# Patient Record
Sex: MALE | Race: WHITE | URBAN - METROPOLITAN AREA
[De-identification: names, ages, dates, MRNs, and addresses within clinical notes are randomized per-mention and may not be internally consistent; named-entity substitution may affect disease eponyms.]

---

## 2017-04-25 ENCOUNTER — TRANSFERRED RECORDS (OUTPATIENT)
Dept: HEALTH INFORMATION MANAGEMENT | Facility: CLINIC | Age: 62
End: 2017-04-25

## 2017-04-28 ENCOUNTER — TRANSFERRED RECORDS (OUTPATIENT)
Dept: HEALTH INFORMATION MANAGEMENT | Facility: CLINIC | Age: 62
End: 2017-04-28

## 2017-05-12 ENCOUNTER — TELEPHONE (OUTPATIENT)
Dept: ORTHOPEDICS | Facility: CLINIC | Age: 62
End: 2017-05-12

## 2017-05-12 ENCOUNTER — PRE VISIT (OUTPATIENT)
Dept: ORTHOPEDICS | Facility: CLINIC | Age: 62
End: 2017-05-12

## 2017-05-12 NOTE — TELEPHONE ENCOUNTER
Litzy from [facility] called to refer patient to us.  Patient has been scheduled with Dr. Kuhn on 5/24/17 at 12:30pm.  I scheduled him this far out as he is scheduled for an MRI the evening of May 22.      Patient has had biopsy.  Slides have not been requested.      Films have been pushed to pacs.  I am faxing notes to scanning.    Claudia Antonio.

## 2017-05-12 NOTE — TELEPHONE ENCOUNTER
1.  Date/reason for appt: 5/24/17 - Rt Arm Mass  2.  Referring provider: Dr. Guillaume Mix  3.  Call to patient (Yes / No - short description): No, records received by clinic  4.  Previous care at / records requested from: [facility] -- [place] received records and sent to urgent scanning, imaging is in Pacs

## 2017-05-24 ENCOUNTER — OFFICE VISIT (OUTPATIENT)
Dept: ORTHOPEDICS | Facility: CLINIC | Age: 62
End: 2017-05-24

## 2017-05-24 VITALS — WEIGHT: 283.6 LBS | HEIGHT: 71 IN | BODY MASS INDEX: 39.7 KG/M2

## 2017-05-24 DIAGNOSIS — M79.89 SOFT TISSUE MASS: Primary | ICD-10-CM

## 2017-05-24 RX ORDER — AMLODIPINE BESYLATE 10 MG/1
10 TABLET ORAL EVERY MORNING
COMMUNITY
Start: 2017-04-18

## 2017-05-24 RX ORDER — FUROSEMIDE 40 MG
40 TABLET ORAL EVERY MORNING
COMMUNITY
Start: 2016-10-18

## 2017-05-24 RX ORDER — METOPROLOL SUCCINATE 50 MG/1
TABLET, EXTENDED RELEASE ORAL
COMMUNITY
Start: 2016-06-12

## 2017-05-24 RX ORDER — AMINO ACIDS/MV,IRON,MIN
1 TABLET ORAL DAILY
COMMUNITY
Start: 2015-04-03

## 2017-05-24 ASSESSMENT — ENCOUNTER SYMPTOMS
NECK PAIN: 0
ARTHRALGIAS: 1
MUSCLE CRAMPS: 0
HYPOTENSION: 0
ORTHOPNEA: 0
BACK PAIN: 1
MYALGIAS: 0
LEG PAIN: 0
HYPERTENSION: 1
LEG SWELLING: 1
STIFFNESS: 1
SWOLLEN GLANDS: 0
MUSCLE WEAKNESS: 0
JOINT SWELLING: 0
SYNCOPE: 0
BRUISES/BLEEDS EASILY: 1
PALPITATIONS: 0

## 2017-05-24 NOTE — PROGRESS NOTES
"ORTHOPEDIC SURGERY CLINIC CONSULT NOTE      REASON FOR VISIT:  Right upper arm mass.      HISTORY OF PRESENT ILLNESS:  Domenico Mayorga is a pleasant 61-year-old male, right-hand dominant, who presents today for evaluation of a large mass in his upper arm.  He says he first noticed this in the middle of April, and it is slowly getting larger.  He mentions no specific pain, but it does ache from time-to-time.  He denies any numbness or tingling down into the distal extremity.  He was evaluated by his primary care doctor, who obtained an MRI, x-rays, and a CT scan of the upper extremity.  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  The patient currently denies any night sweats or pain.  He denies any fevers, chills or recent unexplained weight loss.      REVIEW OF SYSTEMS:  A complete review of systems is otherwise negative, unless as noted above in the HPI.      PAST MEDICAL HISTORY:     1.  Tobacco use:   2.  Hyperglycemia.   3.  Obstructive sleep apnea.   4.  Morbid obesity with a BMI of 39.2.   5.  Hypertension.   6.  History of biliary tube placement.      PAST SURGICAL HISTORY:  Per chart review, there is a history of a biliary tube placement of unknown timing.      ALLERGIES:  Penicillins.      SOCIAL HISTORY:  The patient lives in [place] with his significant other.  He is currently retired, and was a \"\" prior to this.  He smokes 1 pack per day for the last 48 years.  He has occasional alcohol use.      PHYSICAL EXAMINATION:  The patient is alert and oriented x3, in no acute distress.  Breathing is regular and nonlabored.  There is a well circumscribed lesion to his right proximal mid aspect of his arm.  It is well circumscribed and is not adherent to the overlying skin.  It is nontender to palpation and incompressible.  CMS is otherwise intact distally.      IMAGING:  Imaging reports were available via Care Everywhere and were reviewed with Dr." Bam.  MRI does reveal an increased signal uptake on T1 fat suppression with central necrosis.  This is a heterogeneous mass on the medial aspect of the arm.      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      PLAN:  We discussed at length with the patient regarding the diagnosis.  Tissue was sent to me, [facility] would [facility] and review at our [facility] Conference rounds.  I will also refer him for further radiation therapy, likely in [place] where he would prefer to have it obtained.  We also recommend obtaining a CT chest to completely stage him at this time.  We discussed surgical intervention with a wide excision once radiation therapy has been performed with a 4-6 week holiday prior to performing the surgery.  This would likely be a same day surgery with a nerve block.  We discussed at length with the patient, and we will call him with the results of the pathology once reviewed at Tumor Conference.        The patient was seen and discussed with Dr. Kuhn, who agrees with above assessment and plan.   Dictated by Eduar Perez MD, Resident         Answers for HPI/ROS submitted by the patient on 5/24/2017   General Symptoms: No  Skin Symptoms: No  HENT Symptoms: No  EYE SYMPTOMS: No  HEART SYMPTOMS: Yes  LUNG SYMPTOMS: No  INTESTINAL SYMPTOMS: No  URINARY SYMPTOMS: No  REPRODUCTIVE SYMPTOMS: No  SKELETAL SYMPTOMS: Yes  BLOOD SYMPTOMS: Yes  NERVOUS SYSTEM SYMPTOMS: No  MENTAL HEALTH SYMPTOMS: No  Chest pain or pressure: No  Fast or irregular heartbeat: No  Pain in legs with walking: No  Swelling in feet or ankles: Yes  Trouble breathing while lying down: No  Fingers or Toes appear blue: No  High blood pressure: Yes  Low blood pressure: No  Fainting: No  Murmurs: No  Back pain: Yes  Muscle aches: No  Neck pain: No  Swollen joints: No  Joint  pain: Yes  Bone pain: No  Muscle cramps: No  Muscle weakness: No  Joint stiffness: Yes  Bone fracture: No  Anemia: No  Swollen glands: No  Easy bleeding or bruising: Yes    Conflicting answers have been found for some questions. Please document the patient's answers manually.

## 2017-05-24 NOTE — LETTER
5/24/2017       RE: Domenico Mayorga  [address] [facility] [address]     Dear Colleague,    Thank you for referring your patient, Domenico Mayorga, to the [facility]THOP[facility] at [facility]. Please see a copy of my visit note below.    I was present with the resident during the history and exam.  I discussed the case with the resident and agree with the findings as documented in the assessment and plan.    Description of procedure:    The  Patient was placed in a  Supine position. The left shoulder mass was sterilely prepped.  I infiltrated the skin and subcutaneous tissue with lidocaine. Next I made a small incision with an 11 blade. The Seferino-Cut needle was passed 3 times. Myxoid material was obtained. This was sent to pathology. The wound was hemostatic and covered with Steri-Strips and a Band-Aid. The patient was able to ambulate and move his arm without difficulty afterward.    ORTHOPEDIC SURGERY CLINIC CONSULT NOTE      REASON FOR VISIT:  Right upper arm mass.      HISTORY OF PRESENT ILLNESS:  Domenico Mayorga is a pleasant 61-year-old male, right-hand dominant, who presents today for evaluation of a large mass in his upper arm.  He says he first noticed this in the middle of April, and it is slowly getting larger.  He mentions no specific pain, but it does ache from time-to-time.  He denies any numbness or tingling down into the distal extremity.  He was evaluated by his primary care doctor, who obtained an MRI, x-rays, and a CT scan of the upper extremity.  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  The patient currently denies any night sweats or pain.  He denies any fevers, chills or recent unexplained weight loss.      REVIEW OF SYSTEMS:  A complete review of systems is otherwise negative, unless as noted above in the HPI.   PAST MEDICAL HISTORY:     1.  Tobacco use:   2.  Hyperglycemia.   3.  Obstructive sleep apnea.   4.   "Morbid obesity with a BMI of 39.2.   5.  Hypertension.   6.  History of biliary tube placement.      PAST SURGICAL HISTORY:  Per chart review, there is a history of a biliary tube placement of unknown timing.      ALLERGIES:  Penicillins.      SOCIAL HISTORY:  The patient lives in [place] with his significant other.  He is currently retired, and was a \"\" prior to this.  He smokes 1 pack per day for the last 48 years.  He has occasional alcohol use.      PHYSICAL EXAMINATION:  The patient is alert and oriented x3, in no acute distress.  Breathing is regular and nonlabored.  There is a well circumscribed lesion to his right proximal mid aspect of his arm.  It is well circumscribed and is not adherent to the overlying skin.  It is nontender to palpation and incompressible.  CMS is otherwise intact distally.      IMAGING:  Imaging reports were available via Care Everywhere and were reviewed with Dr. Kuhn.  MRI does reveal an increased signal uptake on T1 fat suppression with central necrosis.  This is a heterogeneous mass on the medial aspect of the arm.      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      PLAN:  We discussed at length with the patient regarding the diagnosis.  Tissue was sent to me, we would like to [facility] the [facility] and review at our Tumor Conference rounds.  I will also refer him for further radiation therapy, likely in [place] where he would prefer to have it obtained.  We also recommend obtaining a CT chest to completely stage him at this time.  We discussed surgical intervention with a wide excision once radiation therapy has been performed with a 4-6 week holiday prior to performing the surgery.  This would likely be a same day surgery with a nerve block.  We discussed at length with the patient, and we will call " him with the results of the pathology once reviewed at Tumor Conference.        The patient was seen and discussed with Dr. Kuhn, who agrees with above assessment and plan.   Dictated by Eduar Perez MD, Resident     Answers for HPI/ROS submitted by the patient on 5/24/2017     Conflicting answers have been found for some questions. Please document the patient's answers manually.   Roger Kuhn MD

## 2017-05-24 NOTE — PROGRESS NOTES
I was present with the resident during the history and exam.  I discussed the case with the resident and agree with the findings as documented in the assessment and plan.    Description of procedure:    The  Patient was placed in a  Supine position. The left shoulder mass was sterilely prepped.  I infiltrated the skin and subcutaneous tissue with lidocaine. Next I made a small incision with an 11 blade. The Seferino-Cut needle was passed 3 times. Myxoid material was obtained. This was sent to pathology. The wound was hemostatic and covered with Steri-Strips and a Band-Aid. The patient was able to ambulate and move his arm without difficulty afterward.

## 2017-05-24 NOTE — MR AVS SNAPSHOT
"              After Visit Summary   2017    Domenico Mayorga    MRN: [id number]           Patient Information     Date Of Birth          1955        Visit Information        Provider Department      2017 12:30 PM Roger Kuhn MD [facility]        Today's Diagnoses     Soft tissue mass    -  1       Follow-ups after your visit        Who to contact     Please call your clinic at [phone] to:    Ask questions about your health    Make or cancel appointments    Discuss your medicines    Learn about your test results    Speak to your doctor   If you have compliments or concerns about an experience at your clinic, or if you wish to file a complaint, please contact [facility] Patient Relations at [phone] or email us at [email or web address]         Additional Information About Your Visit        MyChart Information     Evri is an electronic gateway that provides easy, online access to your medical records. With Evri, you can request a clinic appointment, read your test results, renew a prescription or communicate with your care team.     To sign up for [employer] visit the website at [email or web address]   You will be asked to enter the access code listed below, as well as some personal information. Please follow the directions to create your username and password.     Your access code is: [email or web address]  Expires: 2017  6:30 AM     Your access code will  in 90 days. If you need help or a new code, please contact your [facility] Clinic or call [phone] for assistance.        Care EveryWhere ID     This is your Care EveryWhere ID. This could be used by other organizations to access your [facility] medical records  [id number]        Your Vitals Were     Height BMI (Body Mass Index)                1.81 m (5' 11.26\") 39.27 kg/m2           Blood Pressure from Last 3 Encounters:   No data found for " BP    Weight from Last 3 Encounters:   05/24/17 128.6 kg (283 lb 9.6 oz)              We Performed the Following     BIOPSY MUSCLE, PERCUTANEOUS NEEDLE        Primary Care Provider Office Phone # Fax #    Kurtis De Leon [phone]       [address] [address]        Thank you!     Thank you for choosing [facility]OP[facility]  for your care. Our goal is always to provide you with excellent care. Hearing back from our patients is one way we can continue to improve our services. Please take a few minutes to complete the written survey that you may receive in the mail after your visit with us. Thank you!             Your Updated Medication List - Protect others around you: Learn how to safely use, store and throw away your medicines at www.disposemymeds.org.          This list is accurate as of: 5/24/17 11:59 PM.  Always use your most recent med list.                   Brand Name Dispense Instructions for use    amLODIPine 10 MG tablet    NORVASC     Take 10 mg by mouth       aspirin 81 MG EC tablet      Take 81 mg by mouth       furosemide 40 MG tablet    LASIX     Take 40 mg by mouth       metoprolol 50 MG 24 hr tablet    TOPROL-XL     TAKE 1 TABLET BY MOUTH ONCE DAILY. DO NOT CHEW OR CRUSH       OCUVITE EXTRA Tabs

## 2017-05-24 NOTE — NURSING NOTE
"Reason For Visit:   Chief Complaint   Patient presents with     Consult     Pt. states that he is here today for Right Arm Mass. HX: Right Arm Mass Biopsy on 04/28/2017, at [facility]. Referring:  SONIA BERGERON        Pain Assessment  Patient Currently in Pain: Yes  0-10 Pain Scale: 1  Primary Pain Location: Arm  Pain Orientation: Right  Pain Descriptors: Discomfort  Alleviating Factors: Rest               HEIGHT: 5' 11.26\", WEIGHT: 283 lbs 9.6 oz, BMI: Body mass index is 39.27 kg/(m^2).      Current Outpatient Prescriptions   Medication Sig Dispense Refill     amLODIPine (NORVASC) 10 MG tablet Take 10 mg by mouth       aspirin 81 MG EC tablet Take 81 mg by mouth       furosemide (LASIX) 40 MG tablet Take 40 mg by mouth       metoprolol (TOPROL-XL) 50 MG 24 hr tablet TAKE 1 TABLET BY MOUTH ONCE DAILY. DO NOT CHEW OR CRUSH       Multiple Vitamins-Minerals (OCUVITE EXTRA) TABS             Allergies   Allergen Reactions     Penicillins      As child     "

## 2017-05-25 PROCEDURE — 00000346 ZZHCL STATISTIC REVIEW OUTSIDE SLIDES TC 88321: Performed by: ORTHOPAEDIC SURGERY

## 2017-05-26 LAB — COPATH REPORT: NORMAL

## 2017-05-31 ENCOUNTER — TRANSFERRED RECORDS (OUTPATIENT)
Dept: HEALTH INFORMATION MANAGEMENT | Facility: CLINIC | Age: 62
End: 2017-05-31

## 2017-06-06 ENCOUNTER — TELEPHONE (OUTPATIENT)
Dept: ORTHOPEDICS | Facility: CLINIC | Age: 62
End: 2017-06-06

## 2017-06-06 NOTE — TELEPHONE ENCOUNTER
RN calling and spoke with Domenico.   We have obtain imaging and report of the CT Chest done on 05-31-17 at [facility] in [place].  Dr. Kuhn has reviewed the report and the patient has some worrisome nodules.  We would like to discuss with the team at our Thursday conference what the plan will be going forward.  Pt. Will await our call onThursday.

## 2017-06-09 ENCOUNTER — TELEPHONE (OUTPATIENT)
Dept: ORTHOPEDICS | Facility: CLINIC | Age: 62
End: 2017-06-09

## 2017-06-09 NOTE — TELEPHONE ENCOUNTER
RN called and spoke with Domenico.  Patient has some worrisome nodules seen on the CT Chest done on 05-31-17 at [facility] in [place].  We discussed treatment at [facility] tumor conference.  The group decided on a biopsy of the nodules.  Domenico wanted to have it done locally in [place], however after several left voice messages left to [facility].  RN called and secured an appointment with Dr. Gonzalez on June 14 at 1330 here at the [facility].  Patient informed of appointment time and location of Dr. Gonzalez here at the [facility].  He will attend this appointment.  Rosalba Salamanca offered for lodging for overnight, however he denied due to getting a puppy and needs to be back home.  He will call if he changes his mind.

## 2017-06-14 ENCOUNTER — OFFICE VISIT (OUTPATIENT)
Dept: SURGERY | Facility: CLINIC | Age: 62
End: 2017-06-14
Attending: THORACIC SURGERY (CARDIOTHORACIC VASCULAR SURGERY)
Payer: COMMERCIAL

## 2017-06-14 VITALS
TEMPERATURE: 98.6 F | HEIGHT: 72 IN | SYSTOLIC BLOOD PRESSURE: 121 MMHG | OXYGEN SATURATION: 93 % | WEIGHT: 282.9 LBS | BODY MASS INDEX: 38.32 KG/M2 | DIASTOLIC BLOOD PRESSURE: 63 MMHG | RESPIRATION RATE: 12 BRPM | HEART RATE: 65 BPM

## 2017-06-14 DIAGNOSIS — R91.1 NODULE OF LEFT LUNG: Primary | ICD-10-CM

## 2017-06-14 DIAGNOSIS — R91.8 LUNG NODULES: Primary | ICD-10-CM

## 2017-06-14 DIAGNOSIS — C49.9 SARCOMA (H): ICD-10-CM

## 2017-06-14 PROCEDURE — 40000114 ZZH STATISTIC NO CHARGE CLINIC VISIT

## 2017-06-14 RX ORDER — VARENICLINE TARTRATE 1 MG/1
1 TABLET, FILM COATED ORAL
COMMUNITY
Start: 2017-05-31

## 2017-06-14 RX ORDER — CLINDAMYCIN PHOSPHATE 900 MG/50ML
900 INJECTION, SOLUTION INTRAVENOUS
Status: CANCELLED | OUTPATIENT
Start: 2017-06-14

## 2017-06-14 RX ORDER — CLINDAMYCIN PHOSPHATE 900 MG/50ML
900 INJECTION, SOLUTION INTRAVENOUS SEE ADMIN INSTRUCTIONS
Status: CANCELLED | OUTPATIENT
Start: 2017-06-14

## 2017-06-14 ASSESSMENT — PAIN SCALES - GENERAL: PAINLEVEL: NO PAIN (0)

## 2017-06-14 NOTE — LETTER
6/14/2017      RE: Domenico Mayorga  [address] [address]       THORACIC SURGERY - NEW PATIENT OFFICE VISIT      Dear Dr. De Leon,    I saw Mr. Mayorga at Dr. Kuhn s request in consultation for the evaluation and treatment of a bilateral lung nodules in the setting of a RUE sarcoma. .     HPI  Mr. 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 He noticed the bicep mass in April, and a biopsy in early May was positive for sarcoma. On workup he was found to have lung nodules. His oncologist is deciding the combination and order of surgery, chemotherapy, and radiation.     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 The most accessible is in the lingula, close to the fissure with the lower lobe.              PMH  Lower extremity edema for the past 10 years, on lasix.  HTN  COPD    PSH  Tonsillectomy    ETOH rare.   TOB Age 13-present;  2-3 ppd, quit 5 days ago    Physical examination  BMI 38.11  Large mass in RIGHT biceps    From a personal perspective, he lives in [place]. He is retired from working in IT. Prior to that he was a .     IMPRESSION (R91.8) Lung nodules  (primary encounter diagnosis)  (C49.9) Sarcoma (H)      61 year old year-old male with a RUE sarcoma and bilateral lung nodules.     PLAN  I spent a total of 30 minutes with Mr. Mayorga, more than 50% of which were spent in counseling, coordination of care, and face-to-face time. I reviewed the plan as follows:  1) PFT tomorrow  2) PAC tomorrow  3) Procedure planned: Left VATS wedge resection, with possible LEFT thoracotomy.  I reviewed the indications, risks, and benefits of the procedure with Mr. Domenico Mayorga. We discussed the intraoperative risks of bleeding and injury to vital organs, potential postoperative complications including, but not limited to, major respiratory  events, arrhythmia, bleeding, infection, reoperation, and death. I explained the anticipated hospital course (+/- 1-2 days) and postoperative recovery including pain control, chest drain management, and variable degrees of dyspnea (or need for supplemental oxygen) and fatigue that tend to get better with time.  Consent: not obtained today  Regional Anesthesia Plan: intraoperative liposomal bupivacaine  Anticoagulation Plan: enoxaparin 40 mg SQ preop  Smoking Cessation: continue current efforts    All questions were answered and Domenico Mayorga was in agreement with the plan.  I appreciate the opportunity to participate in the care of your patient and will keep you updated.  Sincerely,      Silas Gonzalez MD

## 2017-06-14 NOTE — MR AVS SNAPSHOT
After Visit Summary   6/14/2017    Domenioc Mayorga    MRN: [id number]           Patient Information     Date Of Birth          1955        Visit Information        Provider Department      6/14/2017 1:30 PM Silas Gonzalez MD [facility]        Today's Diagnoses     Lung nodules    -  1    Sarcoma (H)           Follow-ups after your visit        Your next 10 appointments already scheduled     Gentry 15, 2017  7:00 AM CDT   FULL PULMONARY FUNCTION with UC PFL A   [facility] Pulmonary Function Testing ([facility])    [address] [place] [address]  3rd [facility] [address]   [phone]            Gentry 15, 2017  9:30 AM CDT   (Arrive by 9:15 AM)   PAC EVALUATION with  Pac Robi 5   [facility] Preoperative Assessment [place] ([facility])    [address] [place] [id number] [phone]            Gentry 15, 2017 10:30 AM CDT   (Arrive by 10:15 AM)   PAC RN ASSESSMENT with  Pac Rn   [facility] Preoperative Assessment [place] ([facility])    [address] [place] [id number] [phone]            Gentry 15, 2017 11:10 AM CDT   (Arrive by 10:55 AM)   PAC Anesthesia Consult with  Pac Anesthesiologist   [facility] Preoperative Assessment Center ([facility])    [address] [place] [id number] [phone]              Future tests that were ordered for you today     Open Future Orders        Priority Expected Expires Ordered    General PFT Lab (Please always keep checked) Routine  6/14/2018 6/14/2017    Pulmonary Function Test Routine  6/14/2018 6/14/2017            Who to contact     If you have questions or need follow up information about today's clinic visit or your schedule please contact [facility] directly at [phone].  Normal or non-critical lab and imaging results will be  "communicated to you by [employer], letter or phone within 4 business days after the clinic has received the results. If you do not hear from us within 7 days, please contact the clinic through [employer] or phone. If you have a critical or abnormal lab result, we will notify you by phone as soon as possible.  Submit refill requests through [employer] or call your pharmacy and they will forward the refill request to us. Please allow 3 business days for your refill to be completed.          Additional Information About Your Visit        Firethorn Information     Firethorn lets you send messages to your doctor, view your test results, renew your prescriptions, schedule appointments and more. To sign up, go to [email or web address] . Click on \"Log in\" on the left side of the screen, which will take you to the Welcome page. Then click on \"Sign up Now\" on the right side of the page.     You will be asked to enter the access code listed below, as well as some personal information. Please follow the directions to create your username and password.     Your access code is: [email or web address]  Expires: 2017  6:30 AM     Your access code will  in 90 days. If you need help or a new code, please call your [place] clinic or [phone].        Care EveryWhere ID     This is your Care EveryWhere ID. This could be used by other organizations to access your [place] medical records  [id number]        Your Vitals Were     Pulse Temperature Respirations Height Pulse Oximetry BMI (Body Mass Index)    65 98.6  F (37  C) (Oral) 12 1.835 m (6' 0.24\") 93% 38.11 kg/m2       Blood Pressure from Last 3 Encounters:   17 121/63    Weight from Last 3 Encounters:   17 128.3 kg (282 lb 14.4 oz)   17 128.6 kg (283 lb 9.6 oz)              Today, you had the following     No orders found for display       Primary Care Provider Office Phone # Fax #    Kurtis Vivar Haley [phone]       [place] [address] [address] [place] [address]        Thank you!     Thank you for choosing [facility]CER CLINIC  for your care. Our goal is always to provide you with excellent care. Hearing back from our patients is one way we can continue to improve our services. Please take a few minutes to complete the written survey that you may receive in the mail after your visit with us. Thank you!             Your Updated Medication List - Protect others around you: Learn how to safely use, store and throw away your medicines at www.disposemymeds.org.          This list is accurate as of: 6/14/17  3:37 PM.  Always use your most recent med list.                   Brand Name Dispense Instructions for use    amLODIPine 10 MG tablet    NORVASC     Take 10 mg by mouth       aspirin 81 MG EC tablet      Take 81 mg by mouth       furosemide 40 MG tablet    LASIX     Take 40 mg by mouth       metoprolol 50 MG 24 hr tablet    TOPROL-XL     TAKE 1 TABLET BY MOUTH ONCE DAILY. DO NOT CHEW OR CRUSH       OCUVITE EXTRA Tabs          UNABLE TO FIND      Fit and supply ([place]) stocking.       varenicline 1 MG tablet    CHANTIX     Take 1 mg by mouth

## 2017-06-14 NOTE — PROGRESS NOTES
THORACIC SURGERY - NEW PATIENT OFFICE VISIT      Dear Dr. De Leon,    I saw Mr. Mayorga at Dr. Kuhn s request in consultation for the evaluation and treatment of a bilateral lung nodules in the setting of a RUE sarcoma. .     HPI  Mr. 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 He noticed the bicep mass in April, and a biopsy in early May was positive for sarcoma. On workup he was found to have lung nodules. His oncologist is deciding the combination and order of surgery, chemotherapy, and radiation.     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 The most accessible is in the lingula, close to the fissure with the lower lobe.              PMH  Lower extremity edema for the past 10 years, on lasix.  HTN  COPD    PSH  Tonsillectomy    ETOH rare.   TOB Age 13-present;  2-3 ppd, quit 5 days ago    Physical examination  BMI 38.11  Large mass in RIGHT biceps    From a personal perspective, he lives in [place]. He is retired from working in IT. Prior to that he was a .     IMPRESSION (R91.8) Lung nodules  (primary encounter diagnosis)  (C49.9) Sarcoma (H)      61 year old year-old male with a RUE sarcoma and bilateral lung nodules.     PLAN  I spent a total of 30 minutes with Mr. Mayorga, more than 50% of which were spent in counseling, coordination of care, and face-to-face time. I reviewed the plan as follows:  1) PFT tomorrow  2) PAC tomorrow  3) Procedure planned: Left VATS wedge resection, with possible LEFT thoracotomy.  I reviewed the indications, risks, and benefits of the procedure with Mr. Domenico Mayorga. We discussed the intraoperative risks of bleeding and injury to vital organs, potential postoperative complications including, but not limited to, major respiratory events, arrhythmia, bleeding, infection, reoperation, and death. I explained  the anticipated hospital course (+/- 1-2 days) and postoperative recovery including pain control, chest drain management, and variable degrees of dyspnea (or need for supplemental oxygen) and fatigue that tend to get better with time.  Consent: not obtained today  Regional Anesthesia Plan: intraoperative liposomal bupivacaine  Anticoagulation Plan: enoxaparin 40 mg SQ preop  Smoking Cessation: continue current efforts    All questions were answered and Domenico Mayorga was in agreement with the plan.  I appreciate the opportunity to participate in the care of your patient and will keep you updated.  Sincerely,

## 2017-06-15 ENCOUNTER — OFFICE VISIT (OUTPATIENT)
Dept: SURGERY | Facility: CLINIC | Age: 62
End: 2017-06-15

## 2017-06-15 ENCOUNTER — ALLIED HEALTH/NURSE VISIT (OUTPATIENT)
Dept: SURGERY | Facility: CLINIC | Age: 62
End: 2017-06-15

## 2017-06-15 ENCOUNTER — ANESTHESIA EVENT (OUTPATIENT)
Dept: SURGERY | Facility: CLINIC | Age: 62
DRG: 164 | End: 2017-06-15
Payer: COMMERCIAL

## 2017-06-15 ENCOUNTER — ANESTHESIA (OUTPATIENT)
Dept: SURGERY | Facility: CLINIC | Age: 62
DRG: 164 | End: 2017-06-15
Payer: COMMERCIAL

## 2017-06-15 VITALS
HEIGHT: 72 IN | DIASTOLIC BLOOD PRESSURE: 84 MMHG | SYSTOLIC BLOOD PRESSURE: 156 MMHG | TEMPERATURE: 98.1 F | WEIGHT: 281.4 LBS | RESPIRATION RATE: 18 BRPM | HEART RATE: 70 BPM | OXYGEN SATURATION: 94 % | BODY MASS INDEX: 38.11 KG/M2

## 2017-06-15 DIAGNOSIS — R91.1 LUNG NODULE: Primary | ICD-10-CM

## 2017-06-15 DIAGNOSIS — R91.1 LUNG NODULE: ICD-10-CM

## 2017-06-15 DIAGNOSIS — Z01.818 PREOP EXAMINATION: Primary | ICD-10-CM

## 2017-06-15 LAB
ABO + RH BLD: NORMAL
ABO + RH BLD: NORMAL
ANION GAP SERPL CALCULATED.3IONS-SCNC: 9 MMOL/L (ref 3–14)
BLD GP AB SCN SERPL QL: NORMAL
BLOOD BANK CMNT PATIENT-IMP: NORMAL
BLOOD BANK CMNT PATIENT-IMP: NORMAL
BUN SERPL-MCNC: 14 MG/DL (ref 7–30)
CALCIUM SERPL-MCNC: 8.6 MG/DL (ref 8.5–10.1)
CHLORIDE SERPL-SCNC: 106 MMOL/L (ref 94–109)
CO2 SERPL-SCNC: 24 MMOL/L (ref 20–32)
CREAT SERPL-MCNC: 0.85 MG/DL (ref 0.66–1.25)
ERYTHROCYTE [DISTWIDTH] IN BLOOD BY AUTOMATED COUNT: 13.1 % (ref 10–15)
GFR SERPL CREATININE-BSD FRML MDRD: ABNORMAL ML/MIN/1.7M2
GLUCOSE SERPL-MCNC: 118 MG/DL (ref 70–99)
HBA1C MFR BLD: 5.7 % (ref 4.3–6)
HCT VFR BLD AUTO: 51 % (ref 40–53)
HGB BLD-MCNC: 17.7 G/DL (ref 13.3–17.7)
MCH RBC QN AUTO: 31.9 PG (ref 26.5–33)
MCHC RBC AUTO-ENTMCNC: 34.7 G/DL (ref 31.5–36.5)
MCV RBC AUTO: 92 FL (ref 78–100)
PLATELET # BLD AUTO: 265 10E9/L (ref 150–450)
POTASSIUM SERPL-SCNC: 3.9 MMOL/L (ref 3.4–5.3)
RBC # BLD AUTO: 5.54 10E12/L (ref 4.4–5.9)
SODIUM SERPL-SCNC: 140 MMOL/L (ref 133–144)
SPECIMEN EXP DATE BLD: NORMAL
WBC # BLD AUTO: 9.1 10E9/L (ref 4–11)

## 2017-06-15 RX ORDER — IPRATROPIUM BROMIDE AND ALBUTEROL SULFATE 2.5; .5 MG/3ML; MG/3ML
3 SOLUTION RESPIRATORY (INHALATION) ONCE
Status: CANCELLED | OUTPATIENT
Start: 2017-06-15 | End: 2017-06-15

## 2017-06-15 RX ORDER — CHLORHEXIDINE GLUCONATE ORAL RINSE 1.2 MG/ML
15 SOLUTION DENTAL ONCE
Status: CANCELLED | OUTPATIENT
Start: 2017-06-15 | End: 2017-06-15

## 2017-06-15 RX ORDER — CELECOXIB 100 MG/1
200 CAPSULE ORAL ONCE
Status: CANCELLED | OUTPATIENT
Start: 2017-06-15 | End: 2017-06-15

## 2017-06-15 RX ORDER — GABAPENTIN 300 MG/1
300 CAPSULE ORAL ONCE
Status: CANCELLED | OUTPATIENT
Start: 2017-06-15 | End: 2017-06-15

## 2017-06-15 RX ORDER — ACETAMINOPHEN 325 MG/1
975 TABLET ORAL ONCE
Status: CANCELLED | OUTPATIENT
Start: 2017-06-15 | End: 2017-06-15

## 2017-06-15 ASSESSMENT — LIFESTYLE VARIABLES: TOBACCO_USE: 1

## 2017-06-15 NOTE — PATIENT INSTRUCTIONS
Preparing for Your Surgery      Name:  Domenico Mayorga   MRN:  [id number]   :  1955   Today's Date:  6/15/2017     Arriving for surgery:  Surgery date:  17  Surgery time:  730  Arrival time:  0530  Please come to:       [facility] [address]  [address] [place]  [address]    -   parking is available in front of the hospital from 5:15 am to 8:00 pm    -  Stop at the Information Desk in the lobby    -   Inform the information person that you are here for surgery. An escort to 3c will be provided. If you would not like an escort, please proceed to 3C on the 3rd floor. [phone]     What can I eat or drink?  -  You may have solid food or milk products until 8 hours prior to your surgery. 11:30PM  -  You may have water, apple juice or 7up/Sprite until 2 hours prior to your surgery. 5:30AM    Which medicines can I take?  -  Do NOT take these medications in the morning, the day of surgery:  Hold aspirin, lasix, ocuvite and chantix the morning    -  Please take these medications the day of surgery:  Please take amlodipine and metoprolol the morning of your surgery    How do I prepare myself?  -  Take two showers: one the night before surgery; and one the morning of surgery.         Use Scrubcare or Hibiclens to wash from neck down.  You may use your own shampoo and conditioner. No other hair products.   -  Do NOT use lotion, powder, deodorant, or antiperspirant the day of your surgery.  -  Do NOT wear any jewelry.  -Do not bring your own medications to the hospital, except for inhalers and eye drops.  -  Bring your ID and insurance card.    Questions or Concerns:  If you have questions or concerns, please call the  Preoperative Assessment Center, Monday-Friday 7AM-7PM:  [phone]          AFTER YOUR SURGERY  Breathing exercises   Breathing exercises help you recover faster. Take deep breaths and let the air out slowly. This will:     Help you wake up after  surgery.    Help prevent complications like pneumonia.  Preventing complications will help you go home sooner.   Nausea and vomiting   You may feel sick to your stomach after surgery; if so, let your nurse know.    Pain control:  After surgery, you may have pain. Our goal is to help you manage your pain. Pain medicine will help you feel comfortable enough to do activities that will help you heal.  These activities may include breathing exercises, walking and physical therapy.   To help your health care team treat your pain we will ask: 1) If you have pain  2) where it is located 3) describe your pain in your words  Methods of pain control include medications given by mouth, vein or by nerve block for some surgeries.  Sequential Compression Device (SCD) or Pneumo Boots:  You may need to wear SCD S on your legs or feet. These are wraps connected to a machine that pumps in air and releases it. The repeated pumping helps prevent blood clots from forming.

## 2017-06-15 NOTE — H&P
Pre-Operative H & P     CC:  Preoperative exam to assess for increased cardiopulmonary risk while undergoing surgery and anesthesia.    Date of Encounter: 6/15/2017  Primary Care Physician:  Kurtis De Leon  Domenico Mayorga is a 61 year old male who presents for pre-operative H & P in preparation for Left Thoracoscopic Wedge Resection Possible Thoracotomy with Dr. Gonzalez on 6/16/2017 at [facility] under general anesthesia.  Mr. Mayorga was seen in thoracic surgery consultation on 6/14/2017 for bilateral lung nodules in the setting of right upper extremity sarcoma.  Mr. Mayorga noticed the bicep mass this past April. 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  On work-up, patient was found to have lung nodule.    Mr. Mayorga presents to PAC clinic and denies any chest pain, dyspnea or recent fevers, cough, chills or sore throat.  He denies any change in his bowel or bladder habits. He would like to proceed with above surgical intervention.      PAC referral for risk assessment and optimization of anesthesia with comorbid conditions of: HTN; LOUIS; COPD; morbid obesity; LE edema for past 10 years; OA of knees bilaterally; tobacco addiciton with recent cessation; and recent right UE sarcoma and  pulmonary nodules.       History is obtained from the patient and electronic health record.     Past Medical History  Past Medical History:   Diagnosis Date     Hypertension      Lung nodule      Sarcoma (H)     right upper arm     Sleep apnea     no CPap       Past Surgical History  Past Surgical History:   Procedure Laterality Date     SOFT TISSUE SURGERY      sebaceous cyst removal-upper back     TONSILLECTOMY         Hx of Blood transfusions/reactions: denies     Hx of abnormal bleeding or anti-platelet use: ASA - hold DOS    Menstrual history: No LMP for male patient.    Steroid use in the last year: denies    Personal  or FH with difficulty with Anesthesia:  denies    Prior to Admission Medications  Current Outpatient Prescriptions   Medication Sig Dispense Refill     varenicline (CHANTIX) 1 MG tablet Take 1 mg by mouth       UNABLE TO FIND Fit and supply ([facility]) stocking.       amLODIPine (NORVASC) 10 MG tablet Take 10 mg by mouth every morning        aspirin 81 MG EC tablet Take 81 mg by mouth every morning        furosemide (LASIX) 40 MG tablet Take 40 mg by mouth every morning        metoprolol (TOPROL-XL) 50 MG 24 hr tablet TAKE 1 TABLET BY MOUTH ONCE DAILY. DO NOT CHEW OR CRUSH, daily-AM       Multiple Vitamins-Minerals (OCUVITE EXTRA) TABS Take 1 tablet by mouth daily          Allergies  Allergies   Allergen Reactions     Penicillins Unknown     As child       Social History  Social History     Social History     Marital status: Single     Spouse name: N/A     Number of children: 2     Years of education: N/A     Occupational History     retired      Social History Main Topics     Smoking status: Former Smoker     Packs/day: 1.00     Years: 49.00     Types: Cigarettes     Start date: 1/1/1968     Quit date: 6/5/2017     Smokeless tobacco: Not on file      Comment: using chantix     Alcohol use Yes      Comment: 1/month     Drug use: Yes     Special: Marijuana      Comment: daily     Sexual activity: Not on file     Other Topics Concern     Not on file     Social History Narrative    Retired from IT and prior to that .       Family History  No family history on file.                      .Constitutional:  Denies any recent fever, chills or night sweats  ENT: Denies sore throat, visual changes, hearing changes or difficulty with swallowing.   Pulmonary:  H/O LOUIS >>>Does not use CPAP.  COPD without inhalers>>>denies cough, dyspnea, excess sputum or hemoptysis. 45 + pack years smoking history with recently quitting on Chantix.   Neuro:  Denies seizures, TIA, CVA, diplopia, weakness, paraesthesias or paralysis  Psych:   Girish history of depression or anxiety.   Cardiac:  HTN.  Denies chest pain, palpitations, murmur, dizziness, or lightheadedness,  Denies any change in his LE edema that he takes Lasix.    MS:  OA in knees and hands bilaterally  GI:  Morbidly obese. Denies PUD, nausea, vomiting, change in bowel habits or hematochezia.    Renal/hepatic: Denies any history y of kidney or liver problems.   : Denies dysuria or hematuria  Skin: Denies any new rashes, pruritis or lesions of specific concern.   Endocrine: Denies polydipsia or polyuria.  No recent hair loss.    Vitals   /84  HR 71  RR 18 O2sat 94%  Temp 98.1 F Ht 1.829 m (6')  Wt 127.6 kg (281 lb 6.4 oz)  BMI 38.24      Physical Exam  Constitutional: Awake, alert, cooperative, no apparent distress, and appears stated age.  Eyes: Pupils equal, round and reactive to light, extra ocular muscles intact, sclera clear, conjunctiva normal.  HENT: Normocephalic, oral pharynx with moist mucus membranes, dentition in poor to fair repair.  Upper left tooth is chipped. Right thyroid palpable.   Respiratory: Clear to auscultation bilaterally, no crackles or wheezing.  Cardiovascular: Regular rate and rhythm, normal S1 and S2, and no murmur noted.  Carotids +2, no bruits. 1-2+ LE edema with evidence of venous status . Palpable pulses to radial  DP and PT arteries.   GI: Normal bowel sounds, soft, non-distended, non-tender, no masses palpated, no hepatosplenomegaly. Difficult exam given obese abdomen.  Lymph/Hematologic: No cervical lymphadenopathy and no supraclavicular lymphadenopathy.  Genitourinary:  na  Skin: Warm and dry.  No rashes at anticipated surgical site.   Musculoskeletal: Full ROM of neck. There is no redness, warmth, or swelling of the joints. Gross motor strength is normal.    Neurologic: Awake, alert, oriented to name, place and time. Cranial nerves II-XII are grossly intact. Gait is normal.   Neuropsychiatric: Calm, cooperative. Normal affect.     Labs:  (personally reviewed)  Lab Results   Component Value Date    WBC 9.1 06/15/2017     Lab Results   Component Value Date    RBC 5.54 06/15/2017     Lab Results   Component Value Date    HGB 17.7 06/15/2017     Lab Results   Component Value Date    HCT 51.0 06/15/2017     Lab Results   Component Value Date    MCV 92 06/15/2017     Lab Results   Component Value Date    MCH 31.9 06/15/2017     Lab Results   Component Value Date    MCHC 34.7 06/15/2017     Lab Results   Component Value Date    RDW 13.1 06/15/2017     Lab Results   Component Value Date     06/15/2017       Last Basic Metabolic Panel:  Lab Results   Component Value Date     06/15/2017      Lab Results   Component Value Date    POTASSIUM 3.9 06/15/2017     Lab Results   Component Value Date    CHLORIDE 106 06/15/2017     Lab Results   Component Value Date    PHILIP 8.6 06/15/2017     Lab Results   Component Value Date    CO2 24 06/15/2017     Lab Results   Component Value Date    BUN 14 06/15/2017     Lab Results   Component Value Date    CR 0.85 06/15/2017     Lab Results   Component Value Date     06/15/2017     Hgb A1C 5.7 today  Copath Report 05/25/2017 12:00 AM 88   Patient Name: GERRY SCHUSTER   MR#: [id number]   Specimen #: [id number]   Collected: 5/25/2017   Received: 5/25/2017   Reported: 5/26/2017 13:26   Ordering Phy(s): TREY FROST   Additional Phy(s): [facility], Histology Department     For improved result formatting, select 'View Enhanced Report Format'   under Linked Documents section.     TEST(S):   2 Slides, case #[id number]     FINAL DIAGNOSIS:   CASE FROM [facility], [place], [place]   ([id number], OBTAINED 04/28/17):   Soft tissue, right arm mass, biopsy:   - High grade sarcoma with myxoid features, suggestive of   myxofibrosarcoma      Procedures  Echocardiogram 7/11/2017  Interpretation Summary  1. Normal size left ventricle with normal systolic function and no wall  motion abnormalities. EF 55-60%.  2. There is mild concentric left ventricular hypertrophy.  3. No significant valvular regurgitation or stenosis.  4. No pericardial effusion.    Cardiolite Stress Test 5/15/2015  IMPRESSION:  1. No Cardiolite evidence for myocardial ischemia.   2. No chest pain with exercise.   3. Nondiagnostic EKG due to abnormal baseline.   4. Hypertensive response to exercise.   5. Low-normal left ventricular systolic function.    ASSESSMENT and PLAN  Domenico Mayorga is a 61 year old male scheduled to undergo  Left Thoracoscopic Wedge Resection Possible Thoracotomy with Dr. Gonzalez on 6/16/2017 at [facility] under general anesthesia.     Cardiology - RCRI : No serious cardiac risks.  0.4 % risk of major adverse cardiac event. METS >4.  Denies cardiac history or symptoms.  Cardiolite stress test 2015 showed no ischemia.  Echo , 7/2016>>>EF 55-60% with no WMA and no valve issues. HTN, take CCB and BB DOS.  Hold diuretic ans ASA DOS.    Pulmonary - Recently quit smoking.  On Chantix.  45+ pack year smoking hisotry.  COPD without any inhalers.  Lung sounds clear today. PFT's today: FVC 3.2  FEV1 2.38 FEV1/FVC 74% DLCO 31.62.  Known LOUIS, does not use CPAP.  Hematology - VTE risk:  3%.  Increased risk for VTE: Recommend that mechanical VTE prophylaxis be initiated during the perioperative period.    GI - Risk of PONV score = 1.  If > 2, anti-emetic intervention recommended.   Musculoseletal - recommend careful positioning given body habitus and OA of knees bilaterally.    He has the following specific operative considerations:   - Anesthesia considerations:  Refer to PAC assessment in anesthesia records  - Airway:  Appears feasible.      Arrival time, NPO, shower and medication instructions provided by nursing staff today.  Preparing For Your Surgery handout given.  Patient was discussed with Dr Mccall. I spent 20 minutes face to face with patient, assessing,  examining, and educating.    JASON Aguirre CNP  Preoperative Assessment Center  [facility]  Clinic and Surgery Center  Phone: [phone]  Fax: [phone]

## 2017-06-15 NOTE — ANESTHESIA PREPROCEDURE EVALUATION
Anesthesia Evaluation     . Pt has had prior anesthetic. Type: General    No history of anesthetic complications          ROS/MED HX    ENT/Pulmonary:     (+)sleep apnea, other ENT (Wears corrective lenses.)- tobacco use (Quit 6/5/2017.  >45 pack years.), Past use 1-2 PPD packs/day  doesn't use CPAP , . .    Neurologic:  - neg neurologic ROS     Cardiovascular:     (+) hypertension----. : . . . :. . Previous cardiac testing Echodate:7/2016results:Stress Testdate: results: date: results: date: results:          METS/Exercise Tolerance:  >4 METS   Hematologic:  - neg hematologic  ROS       Musculoskeletal:   (+) arthritis (bilateral knees and hands.), , , -       GI/Hepatic:  - neg GI/hepatic ROS       Renal/Genitourinary:  - ROS Renal section negative       Endo:     (+) Obesity (38.19), .      Psychiatric:  - neg psychiatric ROS       Infectious Disease:  - neg infectious disease ROS       Malignancy:   (+) Malignancy History of Lung and Other  Lung CA Active status post. 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  Dentition in poor to fair repair.     Cardiovascular   Rhythm and rate: regular and normal      Pulmonary    breath sounds clear to auscultation    Other findings: Right upper extremity with evidence of mass.  LE 1+ edema with evidence of venous status.  Right thyroid glnad palpable.    Lab Results      Component                Value               Date                      WBC                      9.1                 06/15/2017            Lab Results      Component                Value               Date                      RBC                      5.54                06/15/2017            Lab Results      Component                 Value               Date                      HGB                      17.7                06/15/2017            Lab Results      Component                Value               Date                      HCT                      51.0                06/15/2017            Lab Results      Component                Value               Date                      MCV                      92                  06/15/2017            Lab Results      Component                Value               Date                      MCH                      31.9                06/15/2017            Lab Results      Component                Value               Date                      MCHC                     34.7                06/15/2017            Lab Results      Component                Value               Date                      RDW                      13.1                06/15/2017            Lab Results      Component                Value               Date                      PLT                      265                 06/15/2017              Last Basic Metabolic Panel:  Lab Results      Component                Value               Date                      NA                       140                 06/15/2017             Lab Results      Component                Value               Date                      POTASSIUM                3.9                 06/15/2017            Lab Results      Component                Value               Date                      CHLORIDE                 106                 06/15/2017            Lab Results      Component                Value               Date                      PHILIP                      8.6                 06/15/2017            Lab Results      Component                Value               Date                      CO2                      24                  06/15/2017            Lab Results      Component                Value               Date                      BUN                       14                  06/15/2017            Lab Results      Component                Value               Date                      CR                       0.85                06/15/2017            Lab Results      Component                Value               Date                      GLC                      118                 06/15/2017              Hgb A1C 5.7 today  Copath Report 05/25/2017 12:00 AM 88  Patient Name: GERRY SCHUSTER   MR#: [id number]   Specimen #: [id number]   Collected: 5/25/2017   Received: 5/25/2017   Reported: 5/26/2017 13:26   Ordering Phy(s): TREY FROST   Additional Phy(s): [facility], Histology Department     For improved result formatting, select 'View Enhanced Report Format'   under Linked Documents section.     TEST(S):   2 Slides, case #[id number]     FINAL DIAGNOSIS:   CASE FROM [place], [place]   ([id number], OBTAINED 04/28/17):   Soft tissue, right arm mass, biopsy:   - High grade sarcoma with myxoid features, suggestive of   myxofibrosarcoma     Procedures  Echocardiogram 7/11/2017  Interpretation Summary  1. Normal size left ventricle with normal systolic function and no wall motion abnormalities. EF 55-60%.  2. There is mild concentric left ventricular hypertrophy.  3. No significant valvular regurgitation or stenosis.  4. No pericardial effusion.    Cardiolite Stress Test 5/15/2015  IMPRESSION:  1. No Cardiolite evidence for myocardial ischemia.   2. No chest pain with exercise.   3. Nondiagnostic EKG due to abnormal baseline.   4. Hypertensive response to exercise.   5. Low-normal left ventricular systolic function.           PAC Discussion and Assessment    ASA Classification: 3  Case is suitable for: [place]  Anesthetic techniques and relevant risks discussed: GA  Invasive monitoring and risk discussed:   Types:   Possibility and Risk of blood transfusion discussed: Yes  NPO instructions  given:   Additional anesthetic preparation and risks discussed:   Needs early admission to pre-op area:   Other:     PAC Resident/NP Anesthesia Assessment:  Domenico Mayorga is a 61 year old male scheduled for Left Thoracoscopic Wedge Resection Possible Thoracotomy with Dr. Gonzalez on 6/16/2017 at [facility] under general anesthesia.  Mr. Mayorga was seen in thoracic surgery consultation on 6/14/2017 for bilateral lung nodules in the setting of right upper extremity sarcoma.  Mr. Mayorga noticed the bicep mass this past April. 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  On work-up, patient was found to have lung nodule.    Mr. aMyorga presents to PAC clinic and denies any chest pain, dyspnea or recent fevers, cough, chills or sore throat.  He denies any change in his bowel or bladder habits. He would like to proceed with above surgical intervention.      PAC referral for risk assessment and optimization of anesthesia with comorbid conditions of: HTN; LOUIS; COPD; morbid obesity; LE edema for past 10 years; OA of knees bilaterally and recent right UE sarcoma and pulmonary nodules.      Cardiology - RCRI : No serious cardiac risks.  0.4 % risk of major adverse cardiac event. METS >4.  Denies cardiac history or symptoms.  Cardiolite stress test 2015 showed no ischemia.  Echo , 7/2016>>>EF 55-60% with no WMA and no valve issues. HTN, take CCB and BB DOS.  Hold diuretic ans ASA DOS.    Pulmonary - Recently quit smoking.  On Chantix.  45+ pack year smoking hisotry.  COPD without any inhalers.  Lung sounds clear today. PFT's today: FVC 3.2  FEV1 2.38 FEV1/FVC 74% DLCO 31.62.  Known LOUIS, does not use CPAP.  Hematology - VTE risk:  3%.  Increased risk for VTE: Recommend that mechanical VTE prophylaxis be initiated during the perioperative period.    GI - Risk of PONV score = 1.  If > 2, anti-emetic intervention recommended.    Musculoseletal - recommend careful positioning given body habitus and OA of knees bilaterally.    He has the following specific operative considerations:   - Anesthesia considerations:  Refer to PAC assessment in anesthesia records  - Airway:  Appears feasible.      Arrival time, NPO, shower and medication instructions provided by nursing staff today.  Preparing For Your Surgery handout given.  Patient was discussed with Dr Mccall. I spent 20 minutes face to face with patient, assessing, examining, and educating.    Reviewed and Signed by PAC Mid-Level Provider/Resident  Mid-Level Provider/Resident: Geneva GOMEZ CNP  Date: 6/15/2017  Time: 18:00    Attending Anesthesiologist Anesthesia Assessment:  61 year old for wedge excision of lung nodule in setting of high grade sarcoma of right upper extremity (would avoid right sided IVs and BP). Chart reviewed, patient seen and evaluated; agree with above assessment. No know cardiac disease, but risk factors including long extensive smoking history - quit 5 days ago. Patient has diagnosis of COPD, states that he wheezes on occasion, but has never been started on inhalers. I have written him for a nebulizer treatment, and sent a CPDE consult for them to see him in the postop period. His lungs are clear today, no wheezing, no productive cough, so should do well, but a little RT after surgery might be useful. No known cardiac disease.    Patient is appropriate for the planned procedure without further workup or medical management change. The final anesthesia plan will be determined by the physician anesthesiologist caring for the patient on the day of surgery.      Reviewed and Signed by PAC Anesthesiologist  Anesthesiologist: gricelda  Date: 6/15/2017  Time:   Pass/Fail: Pass  Disposition:     PAC Pharmacist Assessment:        Pharmacist:   Date:   Time:      Anesthesia Plan      History & Physical Review  History and physical reviewed and following examination; no  interval change.    ASA Status:  3 .    NPO Status:  > 8 hours    Plan for General and ETT with Intravenous and Propofol induction. Maintenance will be Balanced and Inhalation.    PONV prophylaxis:  Ondansetron (or other 5HT-3)  Additional equipment: 2nd IV, Videolaryngoscope, Double Lumen ETT, Fiberoptic bronchoscope and Arterial Line   - Ramp for intubation 2/2 BMI of 38    Shirin Lang MD  CA 2 Anesthesia Resident  Pager [phone]    I have examined the patient and reviewed the chart.  I concur with the assessment of Dr. Lang.    Neville Lundberg MD      Postoperative Care  Postoperative pain management:  IV analgesics.      Consents                          .

## 2017-06-15 NOTE — MR AVS SNAPSHOT
After Visit Summary   6/15/2017    Domenico Mayorga    MRN: [id number]           Patient Information     Date Of Birth          1955        Visit Information        Provider Department      6/15/2017 10:30 AM Rn, [facility]        Care Instructions    Preparing for Your Surgery      Name:  Domenico Mayorga   MRN:  [id number]   :  1955   Today's Date:  6/15/2017     Arriving for surgery:  Surgery date:  17  Surgery time:  730  Arrival time:  0530  Please come to:       [facility] [address]  [address] [place]  [address]    -   parking is available in front of the hospital from 5:15 am to 8:00 pm    -  Stop at the Information Desk in the lobby    -   Inform the information person that you are here for surgery. An escort to 3c will be provided. If you would not like an escort, please proceed to 3C on the 3rd floor. [phone]     What can I eat or drink?  -  You may have solid food or milk products until 8 hours prior to your surgery. 11:30PM  -  You may have water, apple juice or 7up/Sprite until 2 hours prior to your surgery. 5:30AM    Which medicines can I take?  -  Do NOT take these medications in the morning, the day of surgery:  Hold aspirin, lasix, ocuvite and chantix the morning    -  Please take these medications the day of surgery:  Please take amlodipine and metoprolol the morning of your surgery    How do I prepare myself?  -  Take two showers: one the night before surgery; and one the morning of surgery.         Use Scrubcare or Hibiclens to wash from neck down.  You may use your own shampoo and conditioner. No other hair products.   -  Do NOT use lotion, powder, deodorant, or antiperspirant the day of your surgery.  -  Do NOT wear any jewelry.  -Do not bring your own medications to the hospital, except for inhalers and eye drops.  -  Bring your ID and insurance card.    Questions or  Concerns:  If you have questions or concerns, please call the  Preoperative Assessment Center, Monday-Friday 7AM-7PM:  [phone]          AFTER YOUR SURGERY  Breathing exercises   Breathing exercises help you recover faster. Take deep breaths and let the air out slowly. This will:     Help you wake up after surgery.    Help prevent complications like pneumonia.  Preventing complications will help you go home sooner.   Nausea and vomiting   You may feel sick to your stomach after surgery; if so, let your nurse know.    Pain control:  After surgery, you may have pain. Our goal is to help you manage your pain. Pain medicine will help you feel comfortable enough to do activities that will help you heal.  These activities may include breathing exercises, walking and physical therapy.   To help your health care team treat your pain we will ask: 1) If you have pain  2) where it is located 3) describe your pain in your words  Methods of pain control include medications given by mouth, vein or by nerve block for some surgeries.  Sequential Compression Device (SCD) or Pneumo Boots:  You may need to wear SCD S on your legs or feet. These are wraps connected to a machine that pumps in air and releases it. The repeated pumping helps prevent blood clots from forming.           Follow-ups after your visit        Your next 10 appointments already scheduled     Gentry 15, 2017  9:30 AM CDT   (Arrive by 9:15 AM)   PAC EVALUATION with Mel Pac Robi 5   [facility] ([facility] [place])    [address] [place] [id number]   [phone]            Gentry 15, 2017 10:30 AM CDT   (Arrive by 10:15 AM)   PAC RN ASSESSMENT with Mel Pac Rn   [facility] [place] ([facility] [place])    [address] [place] [id number]   [phone]            Gentry 15, 2017 11:10 AM CDT   (Arrive by 10:55 AM)   PAC Anesthesia Consult  with  Pac Anesthesiologist   [facility] ([facility])    [address]  4th Floor  [facility] [address] [phone]            2017   Procedure with Silas Gnozalez MD   [facility] [place], Same Day Surgery (--)    [address] [facility] [id number] [phone]              Future tests that were ordered for you today     Open Future Orders        Priority Expected Expires Ordered    General PFT Lab (Please always keep checked) Routine  2018    Pulmonary Function Test Routine  2018            Who to contact     Please call your clinic at [phone] to:    Ask questions about your health    Make or cancel appointments    Discuss your medicines    Learn about your test results    Speak to your doctor   If you have compliments or concerns about an experience at your clinic, or if you wish to file a complaint, please contact [facility] Patient Relations at [phone] or email us at [email or web address]         Additional Information About Your Visit        Whirlpool Information     Whirlpool is an electronic gateway that provides easy, online access to your medical records. With Whirlpool, you can request a clinic appointment, read your test results, renew a prescription or communicate with your care team.     To sign up for Whirlpool visit the website at [email or web address]   You will be asked to enter the access code listed below, as well as some personal information. Please follow the directions to create your username and password.     Your access code is: [email or web address]  Expires: 2017  6:30 AM     Your access code will  in 90 days. If you need help or a new code, please contact your [facility] Clinic or call [phone] for assistance.        Care EveryWhere ID     This is your Care EveryWhere ID. This could be used by other  organizations to access your [place] medical records  [id number]         Blood Pressure from Last 3 Encounters:   06/14/17 121/63    Weight from Last 3 Encounters:   06/14/17 128.3 kg (282 lb 14.4 oz)   05/24/17 128.6 kg (283 lb 9.6 oz)              Today, you had the following     No orders found for display       Primary Care Provider Office Phone # Fax #    Kurtis De Leon [phone]       [address] [address]        Thank you!     Thank you for choosing [facility]  for your care. Our goal is always to provide you with excellent care. Hearing back from our patients is one way we can continue to improve our services. Please take a few minutes to complete the written survey that you may receive in the mail after your visit with us. Thank you!             Your Updated Medication List - Protect others around you: Learn how to safely use, store and throw away your medicines at www.disposemymeds.org.          This list is accurate as of: 6/15/17  9:08 AM.  Always use your most recent med list.                   Brand Name Dispense Instructions for use    amLODIPine 10 MG tablet    NORVASC     Take 10 mg by mouth every morning       aspirin 81 MG EC tablet      Take 81 mg by mouth every morning       furosemide 40 MG tablet    LASIX     Take 40 mg by mouth every morning       metoprolol 50 MG 24 hr tablet    TOPROL-XL     TAKE 1 TABLET BY MOUTH ONCE DAILY. DO NOT CHEW OR CRUSH, daily-AM       OCUVITE EXTRA Tabs      Take 1 tablet by mouth daily       UNABLE TO FIND      Fit and supply ([place]) stocking.       varenicline 1 MG tablet    CHANTIX     Take 1 mg by mouth

## 2017-06-16 ENCOUNTER — HOSPITAL ENCOUNTER (INPATIENT)
Facility: CLINIC | Age: 62
LOS: 2 days | Discharge: HOME | DRG: 164 | End: 2017-06-18
Attending: THORACIC SURGERY (CARDIOTHORACIC VASCULAR SURGERY) | Admitting: THORACIC SURGERY (CARDIOTHORACIC VASCULAR SURGERY)
Payer: COMMERCIAL

## 2017-06-16 ENCOUNTER — APPOINTMENT (OUTPATIENT)
Dept: GENERAL RADIOLOGY | Facility: CLINIC | Age: 62
DRG: 164 | End: 2017-06-16
Attending: THORACIC SURGERY (CARDIOTHORACIC VASCULAR SURGERY)
Payer: COMMERCIAL

## 2017-06-16 DIAGNOSIS — Z98.890 STATUS POST LUNG SURGERY: Primary | ICD-10-CM

## 2017-06-16 PROBLEM — C49.9 SARCOMA (H): Status: ACTIVE | Noted: 2017-06-16

## 2017-06-16 LAB
CREAT SERPL-MCNC: 0.91 MG/DL (ref 0.66–1.25)
GFR SERPL CREATININE-BSD FRML MDRD: 84 ML/MIN/1.7M2
PLATELET # BLD AUTO: 229 10E9/L (ref 150–450)

## 2017-06-16 PROCEDURE — 25000125 ZZHC RX 250: Performed by: THORACIC SURGERY (CARDIOTHORACIC VASCULAR SURGERY)

## 2017-06-16 PROCEDURE — 40000275 ZZH STATISTIC RCP TIME EA 10 MIN

## 2017-06-16 PROCEDURE — 37000009 ZZH ANESTHESIA TECHNICAL FEE, EACH ADDTL 15 MIN: Performed by: THORACIC SURGERY (CARDIOTHORACIC VASCULAR SURGERY)

## 2017-06-16 PROCEDURE — 25000128 H RX IP 250 OP 636: Performed by: ANESTHESIOLOGY

## 2017-06-16 PROCEDURE — 25000132 ZZH RX MED GY IP 250 OP 250 PS 637: Performed by: NURSE PRACTITIONER

## 2017-06-16 PROCEDURE — 88331 PATH CONSLTJ SURG 1 BLK 1SPC: CPT | Performed by: THORACIC SURGERY (CARDIOTHORACIC VASCULAR SURGERY)

## 2017-06-16 PROCEDURE — 71010 XR CHEST PORT 1 VW: CPT

## 2017-06-16 PROCEDURE — 40000274 ZZH STATISTIC RCP CONSULT EA 30 MIN

## 2017-06-16 PROCEDURE — 25000125 ZZHC RX 250: Performed by: ANESTHESIOLOGY

## 2017-06-16 PROCEDURE — 36415 COLL VENOUS BLD VENIPUNCTURE: CPT | Performed by: THORACIC SURGERY (CARDIOTHORACIC VASCULAR SURGERY)

## 2017-06-16 PROCEDURE — 25000566 ZZH SEVOFLURANE, EA 15 MIN: Performed by: THORACIC SURGERY (CARDIOTHORACIC VASCULAR SURGERY)

## 2017-06-16 PROCEDURE — 94640 AIRWAY INHALATION TREATMENT: CPT | Mod: 76

## 2017-06-16 PROCEDURE — 82565 ASSAY OF CREATININE: CPT | Performed by: THORACIC SURGERY (CARDIOTHORACIC VASCULAR SURGERY)

## 2017-06-16 PROCEDURE — 36000064 ZZH SURGERY LEVEL 4 EA 15 ADDTL MIN - UMMC: Performed by: THORACIC SURGERY (CARDIOTHORACIC VASCULAR SURGERY)

## 2017-06-16 PROCEDURE — 12000006 ZZH R&B IMCU INTERMEDIATE UMMC

## 2017-06-16 PROCEDURE — 27211024 ZZHC OR SUPPLY OTHER OPNP: Performed by: THORACIC SURGERY (CARDIOTHORACIC VASCULAR SURGERY)

## 2017-06-16 PROCEDURE — 37000008 ZZH ANESTHESIA TECHNICAL FEE, 1ST 30 MIN: Performed by: THORACIC SURGERY (CARDIOTHORACIC VASCULAR SURGERY)

## 2017-06-16 PROCEDURE — 71000015 ZZH RECOVERY PHASE 1 LEVEL 2 EA ADDTL HR: Performed by: THORACIC SURGERY (CARDIOTHORACIC VASCULAR SURGERY)

## 2017-06-16 PROCEDURE — C9399 UNCLASSIFIED DRUGS OR BIOLOG: HCPCS | Performed by: ANESTHESIOLOGY

## 2017-06-16 PROCEDURE — 25000128 H RX IP 250 OP 636: Performed by: THORACIC SURGERY (CARDIOTHORACIC VASCULAR SURGERY)

## 2017-06-16 PROCEDURE — S0077 INJECTION, CLINDAMYCIN PHOSP: HCPCS | Performed by: THORACIC SURGERY (CARDIOTHORACIC VASCULAR SURGERY)

## 2017-06-16 PROCEDURE — 36000062 ZZH SURGERY LEVEL 4 1ST 30 MIN - UMMC: Performed by: THORACIC SURGERY (CARDIOTHORACIC VASCULAR SURGERY)

## 2017-06-16 PROCEDURE — 40000940 XR CHEST PORT 1 VW

## 2017-06-16 PROCEDURE — 40000014 ZZH STATISTIC ARTERIAL MONITORING DAILY

## 2017-06-16 PROCEDURE — 94640 AIRWAY INHALATION TREATMENT: CPT

## 2017-06-16 PROCEDURE — 27210794 ZZH OR GENERAL SUPPLY STERILE: Performed by: THORACIC SURGERY (CARDIOTHORACIC VASCULAR SURGERY)

## 2017-06-16 PROCEDURE — 85049 AUTOMATED PLATELET COUNT: CPT | Performed by: THORACIC SURGERY (CARDIOTHORACIC VASCULAR SURGERY)

## 2017-06-16 PROCEDURE — 0BBG4ZZ EXCISION OF LEFT UPPER LUNG LOBE, PERCUTANEOUS ENDOSCOPIC APPROACH: ICD-10-PCS | Performed by: THORACIC SURGERY (CARDIOTHORACIC VASCULAR SURGERY)

## 2017-06-16 PROCEDURE — 25000132 ZZH RX MED GY IP 250 OP 250 PS 637: Performed by: THORACIC SURGERY (CARDIOTHORACIC VASCULAR SURGERY)

## 2017-06-16 PROCEDURE — C9290 INJ, BUPIVACAINE LIPOSOME: HCPCS | Performed by: THORACIC SURGERY (CARDIOTHORACIC VASCULAR SURGERY)

## 2017-06-16 PROCEDURE — 88309 TISSUE EXAM BY PATHOLOGIST: CPT | Performed by: THORACIC SURGERY (CARDIOTHORACIC VASCULAR SURGERY)

## 2017-06-16 PROCEDURE — 40000170 ZZH STATISTIC PRE-PROCEDURE ASSESSMENT II: Performed by: THORACIC SURGERY (CARDIOTHORACIC VASCULAR SURGERY)

## 2017-06-16 PROCEDURE — 71000014 ZZH RECOVERY PHASE 1 LEVEL 2 FIRST HR: Performed by: THORACIC SURGERY (CARDIOTHORACIC VASCULAR SURGERY)

## 2017-06-16 RX ORDER — SODIUM CHLORIDE, SODIUM LACTATE, POTASSIUM CHLORIDE, CALCIUM CHLORIDE 600; 310; 30; 20 MG/100ML; MG/100ML; MG/100ML; MG/100ML
INJECTION, SOLUTION INTRAVENOUS CONTINUOUS
Status: DISCONTINUED | OUTPATIENT
Start: 2017-06-16 | End: 2017-06-16 | Stop reason: HOSPADM

## 2017-06-16 RX ORDER — PROPOFOL 10 MG/ML
INJECTION, EMULSION INTRAVENOUS PRN
Status: DISCONTINUED | OUTPATIENT
Start: 2017-06-16 | End: 2017-06-16

## 2017-06-16 RX ORDER — METOPROLOL SUCCINATE 50 MG/1
50 TABLET, EXTENDED RELEASE ORAL DAILY
Status: DISCONTINUED | OUTPATIENT
Start: 2017-06-17 | End: 2017-06-18 | Stop reason: HOSPADM

## 2017-06-16 RX ORDER — CLINDAMYCIN PHOSPHATE 900 MG/50ML
900 INJECTION, SOLUTION INTRAVENOUS
Status: COMPLETED | OUTPATIENT
Start: 2017-06-16 | End: 2017-06-16

## 2017-06-16 RX ORDER — POTASSIUM CHLORIDE 29.8 MG/ML
20 INJECTION INTRAVENOUS
Status: DISCONTINUED | OUTPATIENT
Start: 2017-06-16 | End: 2017-06-18 | Stop reason: HOSPADM

## 2017-06-16 RX ORDER — EPHEDRINE SULFATE 50 MG/ML
INJECTION, SOLUTION INTRAMUSCULAR; INTRAVENOUS; SUBCUTANEOUS PRN
Status: DISCONTINUED | OUTPATIENT
Start: 2017-06-16 | End: 2017-06-16

## 2017-06-16 RX ORDER — IPRATROPIUM BROMIDE AND ALBUTEROL SULFATE 2.5; .5 MG/3ML; MG/3ML
3 SOLUTION RESPIRATORY (INHALATION) 4 TIMES DAILY PRN
Status: DISCONTINUED | OUTPATIENT
Start: 2017-06-16 | End: 2017-06-18 | Stop reason: HOSPADM

## 2017-06-16 RX ORDER — GABAPENTIN 300 MG/1
300 CAPSULE ORAL ONCE
Status: COMPLETED | OUTPATIENT
Start: 2017-06-16 | End: 2017-06-16

## 2017-06-16 RX ORDER — CLINDAMYCIN PHOSPHATE 900 MG/50ML
900 INJECTION, SOLUTION INTRAVENOUS SEE ADMIN INSTRUCTIONS
Status: DISCONTINUED | OUTPATIENT
Start: 2017-06-16 | End: 2017-06-16 | Stop reason: HOSPADM

## 2017-06-16 RX ORDER — LIDOCAINE HYDROCHLORIDE 20 MG/ML
INJECTION, SOLUTION INFILTRATION; PERINEURAL PRN
Status: DISCONTINUED | OUTPATIENT
Start: 2017-06-16 | End: 2017-06-16

## 2017-06-16 RX ORDER — ONDANSETRON 2 MG/ML
4 INJECTION INTRAMUSCULAR; INTRAVENOUS EVERY 30 MIN PRN
Status: DISCONTINUED | OUTPATIENT
Start: 2017-06-16 | End: 2017-06-16 | Stop reason: HOSPADM

## 2017-06-16 RX ORDER — FUROSEMIDE 40 MG
40 TABLET ORAL EVERY MORNING
Status: DISCONTINUED | OUTPATIENT
Start: 2017-06-17 | End: 2017-06-18 | Stop reason: HOSPADM

## 2017-06-16 RX ORDER — POTASSIUM CL/LIDO/0.9 % NACL 10MEQ/0.1L
10 INTRAVENOUS SOLUTION, PIGGYBACK (ML) INTRAVENOUS
Status: DISCONTINUED | OUTPATIENT
Start: 2017-06-16 | End: 2017-06-18 | Stop reason: HOSPADM

## 2017-06-16 RX ORDER — IPRATROPIUM BROMIDE AND ALBUTEROL SULFATE 2.5; .5 MG/3ML; MG/3ML
3 SOLUTION RESPIRATORY (INHALATION) ONCE
Status: COMPLETED | OUTPATIENT
Start: 2017-06-16 | End: 2017-06-16

## 2017-06-16 RX ORDER — SODIUM CHLORIDE, SODIUM LACTATE, POTASSIUM CHLORIDE, CALCIUM CHLORIDE 600; 310; 30; 20 MG/100ML; MG/100ML; MG/100ML; MG/100ML
INJECTION, SOLUTION INTRAVENOUS CONTINUOUS PRN
Status: DISCONTINUED | OUTPATIENT
Start: 2017-06-16 | End: 2017-06-16

## 2017-06-16 RX ORDER — VARENICLINE TARTRATE 1 MG/1
1 TABLET, FILM COATED ORAL 2 TIMES DAILY
Status: DISCONTINUED | OUTPATIENT
Start: 2017-06-16 | End: 2017-06-18 | Stop reason: HOSPADM

## 2017-06-16 RX ORDER — ALBUTEROL SULFATE 90 UG/1
4 AEROSOL, METERED RESPIRATORY (INHALATION)
Status: DISCONTINUED | OUTPATIENT
Start: 2017-06-16 | End: 2017-06-18 | Stop reason: HOSPADM

## 2017-06-16 RX ORDER — NALOXONE HYDROCHLORIDE 0.4 MG/ML
.1-.4 INJECTION, SOLUTION INTRAMUSCULAR; INTRAVENOUS; SUBCUTANEOUS
Status: DISCONTINUED | OUTPATIENT
Start: 2017-06-16 | End: 2017-06-18 | Stop reason: HOSPADM

## 2017-06-16 RX ORDER — LABETALOL HYDROCHLORIDE 5 MG/ML
10-40 INJECTION, SOLUTION INTRAVENOUS EVERY 10 MIN PRN
Status: DISCONTINUED | OUTPATIENT
Start: 2017-06-16 | End: 2017-06-18 | Stop reason: HOSPADM

## 2017-06-16 RX ORDER — AMOXICILLIN 250 MG
1-2 CAPSULE ORAL 2 TIMES DAILY
Status: DISCONTINUED | OUTPATIENT
Start: 2017-06-16 | End: 2017-06-18 | Stop reason: HOSPADM

## 2017-06-16 RX ORDER — ONDANSETRON 4 MG/1
4 TABLET, ORALLY DISINTEGRATING ORAL EVERY 30 MIN PRN
Status: DISCONTINUED | OUTPATIENT
Start: 2017-06-16 | End: 2017-06-16 | Stop reason: HOSPADM

## 2017-06-16 RX ORDER — POTASSIUM CHLORIDE 7.45 MG/ML
10 INJECTION INTRAVENOUS
Status: DISCONTINUED | OUTPATIENT
Start: 2017-06-16 | End: 2017-06-18 | Stop reason: HOSPADM

## 2017-06-16 RX ORDER — CELECOXIB 200 MG/1
200 CAPSULE ORAL ONCE
Status: COMPLETED | OUTPATIENT
Start: 2017-06-16 | End: 2017-06-16

## 2017-06-16 RX ORDER — FENTANYL CITRATE 50 UG/ML
25-50 INJECTION, SOLUTION INTRAMUSCULAR; INTRAVENOUS
Status: DISCONTINUED | OUTPATIENT
Start: 2017-06-16 | End: 2017-06-16 | Stop reason: HOSPADM

## 2017-06-16 RX ORDER — ACETAMINOPHEN 325 MG/1
975 TABLET ORAL ONCE
Status: COMPLETED | OUTPATIENT
Start: 2017-06-16 | End: 2017-06-16

## 2017-06-16 RX ORDER — PANTOPRAZOLE SODIUM 40 MG/1
40 TABLET, DELAYED RELEASE ORAL DAILY
Status: DISCONTINUED | OUTPATIENT
Start: 2017-06-16 | End: 2017-06-18 | Stop reason: HOSPADM

## 2017-06-16 RX ORDER — POTASSIUM CHLORIDE 750 MG/1
20-40 TABLET, EXTENDED RELEASE ORAL
Status: DISCONTINUED | OUTPATIENT
Start: 2017-06-16 | End: 2017-06-18 | Stop reason: HOSPADM

## 2017-06-16 RX ORDER — OXYCODONE HYDROCHLORIDE 5 MG/1
5-10 TABLET ORAL
Status: DISCONTINUED | OUTPATIENT
Start: 2017-06-16 | End: 2017-06-18 | Stop reason: HOSPADM

## 2017-06-16 RX ORDER — IPRATROPIUM BROMIDE AND ALBUTEROL SULFATE 2.5; .5 MG/3ML; MG/3ML
3 SOLUTION RESPIRATORY (INHALATION) 4 TIMES DAILY
Status: DISCONTINUED | OUTPATIENT
Start: 2017-06-16 | End: 2017-06-16

## 2017-06-16 RX ORDER — ONDANSETRON 2 MG/ML
4 INJECTION INTRAMUSCULAR; INTRAVENOUS EVERY 6 HOURS PRN
Status: DISCONTINUED | OUTPATIENT
Start: 2017-06-16 | End: 2017-06-18 | Stop reason: HOSPADM

## 2017-06-16 RX ORDER — ACETAMINOPHEN 325 MG/1
975 TABLET ORAL EVERY 8 HOURS
Status: DISCONTINUED | OUTPATIENT
Start: 2017-06-16 | End: 2017-06-18 | Stop reason: HOSPADM

## 2017-06-16 RX ORDER — ASPIRIN 81 MG/1
81 TABLET ORAL EVERY MORNING
Status: DISCONTINUED | OUTPATIENT
Start: 2017-06-17 | End: 2017-06-18 | Stop reason: HOSPADM

## 2017-06-16 RX ORDER — FENTANYL CITRATE 50 UG/ML
INJECTION, SOLUTION INTRAMUSCULAR; INTRAVENOUS PRN
Status: DISCONTINUED | OUTPATIENT
Start: 2017-06-16 | End: 2017-06-16

## 2017-06-16 RX ORDER — POTASSIUM CHLORIDE 1.5 G/1.58G
20-40 POWDER, FOR SOLUTION ORAL
Status: DISCONTINUED | OUTPATIENT
Start: 2017-06-16 | End: 2017-06-18 | Stop reason: HOSPADM

## 2017-06-16 RX ORDER — MAGNESIUM SULFATE HEPTAHYDRATE 40 MG/ML
4 INJECTION, SOLUTION INTRAVENOUS EVERY 4 HOURS PRN
Status: DISCONTINUED | OUTPATIENT
Start: 2017-06-16 | End: 2017-06-18 | Stop reason: HOSPADM

## 2017-06-16 RX ORDER — ONDANSETRON 4 MG/1
4 TABLET, ORALLY DISINTEGRATING ORAL EVERY 6 HOURS PRN
Status: DISCONTINUED | OUTPATIENT
Start: 2017-06-16 | End: 2017-06-18 | Stop reason: HOSPADM

## 2017-06-16 RX ORDER — CHLORHEXIDINE GLUCONATE ORAL RINSE 1.2 MG/ML
15 SOLUTION DENTAL ONCE
Status: COMPLETED | OUTPATIENT
Start: 2017-06-16 | End: 2017-06-16

## 2017-06-16 RX ORDER — ACETAMINOPHEN 325 MG/1
650 TABLET ORAL EVERY 4 HOURS PRN
Status: DISCONTINUED | OUTPATIENT
Start: 2017-06-19 | End: 2017-06-17

## 2017-06-16 RX ORDER — HYDROMORPHONE HYDROCHLORIDE 1 MG/ML
.3-.5 INJECTION, SOLUTION INTRAMUSCULAR; INTRAVENOUS; SUBCUTANEOUS EVERY 5 MIN PRN
Status: DISCONTINUED | OUTPATIENT
Start: 2017-06-16 | End: 2017-06-16 | Stop reason: HOSPADM

## 2017-06-16 RX ORDER — ESMOLOL HYDROCHLORIDE 10 MG/ML
INJECTION INTRAVENOUS PRN
Status: DISCONTINUED | OUTPATIENT
Start: 2017-06-16 | End: 2017-06-16

## 2017-06-16 RX ORDER — ONDANSETRON 2 MG/ML
INJECTION INTRAMUSCULAR; INTRAVENOUS PRN
Status: DISCONTINUED | OUTPATIENT
Start: 2017-06-16 | End: 2017-06-16

## 2017-06-16 RX ADMIN — PROPOFOL 30 MG: 10 INJECTION, EMULSION INTRAVENOUS at 07:49

## 2017-06-16 RX ADMIN — CLINDAMYCIN PHOSPHATE 900 MG: 18 INJECTION, SOLUTION INTRAVENOUS at 08:31

## 2017-06-16 RX ADMIN — LIDOCAINE HYDROCHLORIDE 100 MG: 20 INJECTION, SOLUTION INFILTRATION; PERINEURAL at 07:41

## 2017-06-16 RX ADMIN — HYDROMORPHONE HYDROCHLORIDE 0.3 MG: 1 INJECTION, SOLUTION INTRAMUSCULAR; INTRAVENOUS; SUBCUTANEOUS at 09:33

## 2017-06-16 RX ADMIN — FENTANYL CITRATE 100 MCG: 50 INJECTION, SOLUTION INTRAMUSCULAR; INTRAVENOUS at 07:41

## 2017-06-16 RX ADMIN — IPRATROPIUM BROMIDE AND ALBUTEROL SULFATE 3 ML: .5; 3 SOLUTION RESPIRATORY (INHALATION) at 13:27

## 2017-06-16 RX ADMIN — ESMOLOL HYDROCHLORIDE 10 MG: 10 INJECTION, SOLUTION INTRAVENOUS at 08:58

## 2017-06-16 RX ADMIN — ROCURONIUM BROMIDE 20 MCG: 10 INJECTION INTRAVENOUS at 08:31

## 2017-06-16 RX ADMIN — ACETAMINOPHEN 975 MG: 325 TABLET, FILM COATED ORAL at 11:34

## 2017-06-16 RX ADMIN — Medication: at 14:43

## 2017-06-16 RX ADMIN — POTASSIUM CHLORIDE 20 MEQ: 750 TABLET, EXTENDED RELEASE ORAL at 20:20

## 2017-06-16 RX ADMIN — ACETAMINOPHEN 975 MG: 325 TABLET, FILM COATED ORAL at 05:56

## 2017-06-16 RX ADMIN — IPRATROPIUM BROMIDE AND ALBUTEROL SULFATE 3 ML: .5; 3 SOLUTION RESPIRATORY (INHALATION) at 16:19

## 2017-06-16 RX ADMIN — SODIUM CHLORIDE, POTASSIUM CHLORIDE, SODIUM LACTATE AND CALCIUM CHLORIDE: 600; 310; 30; 20 INJECTION, SOLUTION INTRAVENOUS at 07:26

## 2017-06-16 RX ADMIN — ESMOLOL HYDROCHLORIDE 10 MG: 10 INJECTION, SOLUTION INTRAVENOUS at 08:44

## 2017-06-16 RX ADMIN — Medication 10 MG: at 07:53

## 2017-06-16 RX ADMIN — PROPOFOL 20 MG: 10 INJECTION, EMULSION INTRAVENOUS at 07:59

## 2017-06-16 RX ADMIN — FENTANYL CITRATE 50 MCG: 50 INJECTION, SOLUTION INTRAMUSCULAR; INTRAVENOUS at 08:41

## 2017-06-16 RX ADMIN — FENTANYL CITRATE 50 MCG: 50 INJECTION, SOLUTION INTRAMUSCULAR; INTRAVENOUS at 08:34

## 2017-06-16 RX ADMIN — ONDANSETRON 4 MG: 2 INJECTION INTRAMUSCULAR; INTRAVENOUS at 08:45

## 2017-06-16 RX ADMIN — CELECOXIB 200 MG: 200 CAPSULE ORAL at 05:56

## 2017-06-16 RX ADMIN — PROPOFOL 150 MG: 10 INJECTION, EMULSION INTRAVENOUS at 07:41

## 2017-06-16 RX ADMIN — SODIUM CHLORIDE, POTASSIUM CHLORIDE, SODIUM LACTATE AND CALCIUM CHLORIDE 1000 ML: 600; 310; 30; 20 INJECTION, SOLUTION INTRAVENOUS at 10:44

## 2017-06-16 RX ADMIN — SUGAMMADEX 260 MG: 100 INJECTION, SOLUTION INTRAVENOUS at 09:50

## 2017-06-16 RX ADMIN — ROCURONIUM BROMIDE 50 MCG: 10 INJECTION INTRAVENOUS at 07:41

## 2017-06-16 RX ADMIN — VARENICLINE TARTRATE 1 MG: 1 TABLET, FILM COATED ORAL at 20:19

## 2017-06-16 RX ADMIN — Medication 0.2 MG: at 11:41

## 2017-06-16 RX ADMIN — GABAPENTIN 300 MG: 300 CAPSULE ORAL at 05:56

## 2017-06-16 RX ADMIN — FENTANYL CITRATE 25 MCG: 50 INJECTION INTRAMUSCULAR; INTRAVENOUS at 11:46

## 2017-06-16 RX ADMIN — PANTOPRAZOLE SODIUM 40 MG: 40 TABLET, DELAYED RELEASE ORAL at 14:43

## 2017-06-16 RX ADMIN — ROCURONIUM BROMIDE 10 MCG: 10 INJECTION INTRAVENOUS at 08:58

## 2017-06-16 RX ADMIN — ENOXAPARIN SODIUM 40 MG: 40 INJECTION SUBCUTANEOUS at 06:39

## 2017-06-16 RX ADMIN — SENNOSIDES AND DOCUSATE SODIUM 1 TABLET: 8.6; 5 TABLET ORAL at 20:19

## 2017-06-16 RX ADMIN — CHLORHEXIDINE GLUCONATE 15 ML: 1.2 RINSE ORAL at 05:59

## 2017-06-16 RX ADMIN — PROPOFOL 30 MG: 10 INJECTION, EMULSION INTRAVENOUS at 08:07

## 2017-06-16 RX ADMIN — IPRATROPIUM BROMIDE AND ALBUTEROL SULFATE 3 ML: .5; 3 SOLUTION RESPIRATORY (INHALATION) at 06:08

## 2017-06-16 ASSESSMENT — PAIN DESCRIPTION - DESCRIPTORS
DESCRIPTORS: CONSTANT
DESCRIPTORS: CONSTANT;SHARP
DESCRIPTORS: SHARP

## 2017-06-16 NOTE — ADDENDUM NOTE
Addendum  created 06/16/17 1021 by Shirin Lang MD    Anesthesia Event edited, Anesthesia Intra Flowsheets edited, Procedure Event Log accessed, Sign clinical note

## 2017-06-16 NOTE — ADDENDUM NOTE
Addendum  created 06/16/17 0844 by Neville Lundberg MD    Anesthesia Event edited, Procedure Event Log accessed, Sign clinical note

## 2017-06-16 NOTE — PLAN OF CARE
"Problem: Goal Outcome Summary  Goal: Goal Outcome Summary  Outcome: No Change  /72  Pulse 64  Temp 98  F (36.7  C) (Oral)  Resp 16  Ht 1.83 m (6' 0.05\")  Wt 126.8 kg (279 lb 8.7 oz)  SpO2 97%  BMI 37.86 kg/m2     VS: VSS, afebrile  Neuro: A&Ox4.   Cardiac: SR.    Respiratory: Sating 97% on 3L NC. CO2 on capno is 43, IPI is 8-10.  GI: no BM   : has not voided since OR  IV Access: R PIV SL'd  Drains/tubes:L chest tube to water seal - dark red/cherry output. No airleak. Clamps at bedside  Diet/appetite: Clears  Activity:  Ax2 to turn, has not gotten up since surg yet  Pain: Sharp pain with movement and deep breathing on L chest side where chest tube site is. Prn tylenol nad oxy (pt declines need), and dilaudid PCA (pt not using often).   Skin: L chest incision from chest tube - LAURA, dressing CDI.      R: Continue with POC. Notify primary team with changes.          "

## 2017-06-16 NOTE — ADDENDUM NOTE
Addendum  created 06/16/17 1028 by Shirin Lang MD    Anesthesia Attestations filed, Anesthesia Event deleted, Anesthesia Event edited, Anesthesia Intra Blocks edited, Anesthesia Intra Flowsheets edited, Anesthesia Intra LDAs edited, Anesthesia Intra Meds edited, Anesthesia Staff edited, Child order released for a procedure order, Flowsheet data copied forward, LDA properties accepted, Order sets accessed, Patient device added, Patient device removed, Procedure Event Log accessed, Sign clinical note

## 2017-06-16 NOTE — OR NURSING
Dr Gonzalez here at bedside to see pt. Notified pt of initial findings; pt encouraged to take pain meds as needed - pt accepted receiving narcotic dose to improve deep breathing.

## 2017-06-16 NOTE — OP NOTE
DATE OF PROCEDURE:  06/16/2017      PREOPERATIVE DIAGNOSES:   1.  Multiple pulmonary nodules.   2.  Right upper extremity soft tissue sarcoma.      POSTOPERATIVE DIAGNOSES:   1.  Multiple pulmonary nodules.   2.  Right upper extremity soft tissue sarcoma.      PROCEDURE PERFORMED:  Left unipolar upper lobe wedge resection.      SURGEON:  Ortiz Snow MD (present and participated in the entire procedure).      RESIDENT SURGEON:  Zack Rangel MD.       ANESTHESIA:  General.      ESTIMATED BLOOD LOSS:  30 mL.      COMPLICATIONS:  None immediate.      FINDINGS:  We took about half of the lingula, which included the nodule since it was in a tough location.  The nodule, on frozen section, was consistent with sarcoma.      DESCRIPTION OF PROCEDURE:  With Domenico Mayorga in the right lateral decubitus under general anesthesia with a double-lumen tube in place, the left chest was prepared and draped in the conventional fashion.  We made a 5 cm incision in the 6th intercostal space and split the serratus muscle and incised the intercostal muscle and placed a median wound protector.  We were able to grab the tip of the lingula and pull it out through the wound and then I could palpate the nodule towards the fissure with the lower lobe.  The nodule was in the tough location, but we were able to perform a wedge resection of the tip of the lingula taking about 30%-50% of the lingula out with a wedge resection.  We removed it with the wound protector and it contained the nodule and we placed a stitch on the nodule to andreia it for the pathologist.  Frozen section was consistent with sarcoma.      We ensured hemostasis, placed a 24-[place] chest tube and then closed with absorbable sutures in conventional fashion.      The patient tolerated procedure well.         ORTIZ SNOW MD             D: 06/16/2017 15:51   T: 06/16/2017 17:16   MT: TD      Name:     DOMENICO MAYORGA   MRN:      [id number]        Account:         [id number]   :      1955           Procedure Date: 2017      Document: [id number]       cc: [facility] Billing

## 2017-06-16 NOTE — PROGRESS NOTES
Transfer  Transferred from: PACU  Via:bed  Reason for transfer:Pt appropriate for 6B  Family: Aware of transfer  Belongings: Received with pt  Chart: Received with pt  Medications: Meds received from old unit with pt  2 RN Skin Assessment Completed By: Lily  Report received from: Janae  Pt status: stable, A&Ox4. See flowsheet.

## 2017-06-16 NOTE — IP AVS SNAPSHOT
MRN:[id number]                      After Visit Summary   6/16/2017    Domenico Mayorga    MRN: [id number]           Thank you!     Thank you for choosing [facility] for your care. Our goal is always to provide you with excellent care. Hearing back from our patients is one way we can continue to improve our services. Please take a few minutes to complete the written survey that you may receive in the mail after you visit with us. Thank you!        Patient Information     Date Of Birth          1955        Designated Caregiver       Most Recent Value    Caregiver    Will someone help with your care after discharge? no      About your hospital stay     You were admitted on:  June 16, 2017 You last received care in the:  [address] [facility] [place]    You were discharged on:  June 18, 2017        Reason for your hospital stay       S/p L VATS wedge                  Who to Call     For medical emergencies, please call 911.  For non-urgent questions about your medical care, please call your primary care provider or clinic, [phone]  For questions related to your surgery, please call your surgery clinic        Attending Provider     Provider Specialty    Silas Gonzalez MD Thoracic Diseases       Primary Care Provider Office Phone # Fax #    Kurtis De Leon [phone]      After Care Instructions     Diet       Follow this diet upon discharge: Regular Diet Adult            Discharge Instructions       THORACIC SURGERY DISCHARGE INSTRUCTIONS    DIET: Regular diet as tolerated    If your plans upon discharge include prolonged periods of sitting (i.e a lengthy car or plane ride), it is highly beneficial to get up and walk at least once per hour to help prevent swelling and blood clots.     You may remove chest tube dressing 48 hours after tube removal and bandage the site at your own discretion thereafter.  Small amounts of leakage are normal for 2-3 days after removal.  Feel  "free to call with questions.    You may get incision wet 2 days after operation. Do not submerge, soak, or scrub incision or swim until seen in follow-up.    Take incentive spirometer home for continued frequent use    Activity as tolerated, no strenous activity until seen in follow-up, no lifting greater than 20 pounds for the next 4 weeks.    Stay hydrated. Take over the counter fiber (metamucil or benefiber) and stool softeners (Miralax, docusate or senna) if becoming constipated.     Call for fever greater than 101.5, chills, increased size of incision, red skin around incision, vision changes, muscle strength changes, sensation changes, shortness of breath, or other concerns.    No driving while taking narcotic pain medication.    Transition to ibuprofen or tylenol/acetaminophen for pain control. Do not take tylenol/acetaminophen and acetaminophen containing narcotic (e.g., percocet or vicodin) at the same time. If you have known ulcer problems, or kidney trouble (elevated creatinine) do not take the ibuprofen.    In emergencies, call 911    For other Questions or Concerns;   A.) During weekday working hours (Monday through Friday 8am to 4:30pm)   call [phone] and ask to speak to a clinical nurse specialist.     B.) At nights (after 4:30pm), on weekends, or if urgent call [phone] and   tell the  \"I would like to page job code [phone], the thoracic surgery   fellow on call, please.\"                  Follow-up Appointments     Adult [facility] Follow-up and recommended labs and tests       Follow up with DARIUSZ Williamson in 4 weeks  to evaluate after surgery. The following labs/tests are recommended: Chest XR.    Appointments on [place] and/or [facility] (with [facility] or [facility] provider or service). Call [phone] if you haven't heard regarding these appointments within 7 days of discharge.                  Future tests that were ordered for you     X-ray Chest 2 vws*               " "  Pending Results     Date and Time Order Name Status Description    2017 0916 Surgical pathology exam Preliminary             Statement of Approval     Ordered          17 1226  I have reviewed and agree with all the recommendations and orders detailed in this document.  EFFECTIVE NOW     Approved and electronically signed by:  Kimmie Zafar MD             Admission Information     Date & Time Department Dept. Phone    2017 [address] [facility] [phone]      Your Vitals Were     Blood Pressure Pulse Temperature Respirations Height Weight    132/71 (BP Location: Left arm) 77 97.9  F (36.6  C) (Oral) 20 1.83 m (6' 0.05\") 127.7 kg (281 lb 8 oz)    Pulse Oximetry BMI (Body Mass Index)                92% 38.13 kg/m2          [employer] Information     [employer] lets you send messages to your doctor, view your test results, renew your prescriptions, schedule appointments and more. To sign up, go to [email or web address] . Click on \"Log in\" on the left side of the screen, which will take you to the Welcome page. Then click on \"Sign up Now\" on the right side of the page.     You will be asked to enter the access code listed below, as well as some personal information. Please follow the directions to create your username and password.     Your access code is: [email or web address]  Expires: 2017  6:30 AM     Your access code will  in 90 days. If you need help or a new code, please call your [facility] clinic or [phone].        Care EveryWhere ID     This is your Care EveryWhere ID. This could be used by other organizations to access your [facility] medical records  [id number]           Review of your medicines      START taking        Dose / Directions    acetaminophen 325 MG tablet   Commonly known as:  TYLENOL   Used for:  Status post lung surgery        Dose:  975 mg   Take 3 tablets (975 mg) by mouth every 8 hours   Quantity:  100 tablet   Refills:  0       oxyCODONE 5 MG IR tablet "   Commonly known as:  ROXICODONE   Used for:  Status post lung surgery        Dose:  5-10 mg   Take 1-2 tablets (5-10 mg) by mouth every 4 hours as needed for moderate to severe pain   Quantity:  30 tablet   Refills:  0       senna-docusate 8.6-50 MG per tablet   Commonly known as:  SENOKOT-S;PERICOLACE   Used for:  Status post lung surgery        Dose:  1-2 tablet   Take 1-2 tablets by mouth 2 times daily   Quantity:  100 tablet   Refills:  0         CONTINUE these medicines which have NOT CHANGED        Dose / Directions    amLODIPine 10 MG tablet   Commonly known as:  NORVASC        Dose:  10 mg   Take 10 mg by mouth every morning   Refills:  0       aspirin 81 MG EC tablet        Dose:  81 mg   Take 81 mg by mouth every morning   Refills:  0       furosemide 40 MG tablet   Commonly known as:  LASIX        Dose:  40 mg   Take 40 mg by mouth every morning   Refills:  0       metoprolol 50 MG 24 hr tablet   Commonly known as:  TOPROL-XL        TAKE 1 TABLET BY MOUTH ONCE DAILY. DO NOT CHEW OR CRUSH, daily-AM   Refills:  0       OCUVITE EXTRA Tabs        Dose:  1 tablet   Take 1 tablet by mouth daily   Refills:  0       UNABLE TO FIND        Fit and supply ([facility]) stocking.   Refills:  0       varenicline 1 MG tablet   Commonly known as:  CHANTIX        Dose:  1 mg   Take 1 mg by mouth   Refills:  0            Where to get your medicines      These medications were sent to [facility] [place], [place] - [address] [address]     Phone:  [phone]     acetaminophen 325 MG tablet    senna-docusate 8.6-50 MG per tablet         Some of these will need a paper prescription and others can be bought over the counter. Ask your nurse if you have questions.     Bring a paper prescription for each of these medications     oxyCODONE 5 MG IR tablet                Protect others around you: Learn how to safely use, store and throw away your medicines at  www.disposemymeds.org.             Medication List: This is a list of all your medications and when to take them. Check marks below indicate your daily home schedule. Keep this list as a reference.      Medications           Morning Afternoon Evening Bedtime As Needed    acetaminophen 325 MG tablet   Commonly known as:  TYLENOL   Take 3 tablets (975 mg) by mouth every 8 hours   Last time this was given:  975 mg on 6/18/2017 10:29 AM                                amLODIPine 10 MG tablet   Commonly known as:  NORVASC   Take 10 mg by mouth every morning   Last time this was given:  10 mg on 6/18/2017  9:06 AM                                aspirin 81 MG EC tablet   Take 81 mg by mouth every morning   Last time this was given:  81 mg on 6/18/2017  9:05 AM                                furosemide 40 MG tablet   Commonly known as:  LASIX   Take 40 mg by mouth every morning   Last time this was given:  40 mg on 6/18/2017  9:05 AM                                metoprolol 50 MG 24 hr tablet   Commonly known as:  TOPROL-XL   TAKE 1 TABLET BY MOUTH ONCE DAILY. DO NOT CHEW OR CRUSH, daily-AM   Last time this was given:  50 mg on 6/18/2017  9:06 AM                                OCUVITE EXTRA Tabs   Take 1 tablet by mouth daily                                oxyCODONE 5 MG IR tablet   Commonly known as:  ROXICODONE   Take 1-2 tablets (5-10 mg) by mouth every 4 hours as needed for moderate to severe pain                                senna-docusate 8.6-50 MG per tablet   Commonly known as:  SENOKOT-S;PERICOLACE   Take 1-2 tablets by mouth 2 times daily   Last time this was given:  1 tablet on 6/18/2017  9:05 AM                                UNABLE TO FIND   Fit and supply ([facility]) stocking.                                varenicline 1 MG tablet   Commonly known as:  CHANTIX   Take 1 mg by mouth   Last time this was given:  1 mg on 6/18/2017  9:06 AM

## 2017-06-16 NOTE — IP AVS SNAPSHOT
[address] [address]    Phone:  [phone]                                       After Visit Summary   6/16/2017    Domenico Mayorga    MRN: [id number]           After Visit Summary Signature Page     I have received my discharge instructions, and my questions have been answered. I have discussed any challenges I see with this plan with the nurse or doctor.    ..........................................................................................................................................  Patient/Patient Representative Signature      ..........................................................................................................................................  Patient Representative Print Name and Relationship to Patient    ..................................................               ................................................  Date                                            Time    ..........................................................................................................................................  Reviewed by Signature/Title    ...................................................              ..............................................  Date                                                            Time

## 2017-06-16 NOTE — ANESTHESIA CARE TRANSFER NOTE
Patient: Domenico Mayorga    Procedure(s):  Left Thoracoscopic Upper Lobe Wedge Resection  - Wound Class: I-Clean   - Wound Class: II-Clean Contaminated    Diagnosis: Left Lung Nodule   Diagnosis Additional Information: No value filed.    Anesthesia Type:   General, ETT     Note:  Airway :Face Mask  Patient transferred to:PACU  Comments: Stable      Vitals: (Last set prior to Anesthesia Care Transfer)    CRNA VITALS  6/16/2017 0940 - 6/16/2017 1018      6/16/2017             Pulse: 69    ART BP: (!)  137/39    ART Mean: 39    SpO2: 96 %    Resp Rate (observed): (!)  3                Electronically Signed By: Shirin Lang MD  June 16, 2017  10:18 AM

## 2017-06-16 NOTE — OR NURSING
Text page sent to Dr Rangel to notify that PCXR has been complete. Will await call back if there are any concerns. Pt stable, drowsy.

## 2017-06-16 NOTE — OR NURSING
Dr Rangel at bedside to see pt; CXR is OK - no new orders received. CT to water seal, no air leak.

## 2017-06-16 NOTE — BRIEF OP NOTE
[facility], [place]    Brief Operative Note    Pre-operative diagnosis: Left Lung Nodule   Post-operative diagnosis Left Lung Nodule   Procedure: Procedure(s):  Left Thoracoscopic Upper Lobe Wedge Resection  - Wound Class: I-Clean   - Wound Class: II-Clean Contaminated  Surgeon: Surgeon(s) and Role:     * Silas Gonzalez MD - Primary     * Zack Rangel MD - Assisting  Anesthesia: General   Estimated blood loss: 20mL  Drains: Left chest tube  Specimens:   ID Type Source Tests Collected by Time Destination   A : left upper lobe lung Tissue Lung, Left Upper Lobe SURGICAL PATHOLOGY EXAM Silas Gonzalez MD 6/16/2017  9:16 AM      Findings:   lingular nodule sent for pathology  Complications: none  Implants: none

## 2017-06-16 NOTE — ANESTHESIA POSTPROCEDURE EVALUATION
Patient: Domenico Mayorga    Procedure(s):  Left Thoracoscopic Upper Lobe Wedge Resection  - Wound Class: I-Clean   - Wound Class: II-Clean Contaminated    Diagnosis:Left Lung Nodule   Diagnosis Additional Information: No value filed.    Anesthesia Type:  General, ETT    Note:  Anesthesia Post Evaluation    Patient location during evaluation: PACU  Patient participation: Able to fully participate in evaluation  Level of consciousness: awake and alert  Pain management: adequate  Airway patency: patent  Cardiovascular status: acceptable  Respiratory status: acceptable  Hydration status: acceptable  PONV: none             Last vitals:  Vitals:    06/16/17 0614   BP: 134/86   Resp: 18   Temp: 36.9  C (98.5  F)   SpO2: 95%         Electronically Signed By: Neville Lundberg MD  June 16, 2017  10:47 AM

## 2017-06-16 NOTE — ANESTHESIA PROCEDURE NOTES
Arterial Line Procedure Note  Staff:     Anesthesiologist:  SAMEER HORVATH    Resident/CRNA:  GREG MONROE    Arterial line performed by resident/CRNA in presence of a teaching physician    Location: In OR After Induction  Procedure Start/Stop Times:     patient identified, IV checked, site marked, risks and benefits discussed, informed consent, monitors and equipment checked, pre-op evaluation and at physician/surgeon's request      Correct Patient: Yes      Correct Position: Yes      Correct Site: Yes      Correct Procedure: Yes      Correct Laterality:  Yes    Site Marked:  Yes  Line Placement:     Procedure:  Arterial Line    Insertion Site:  Radial    Insertion laterality:  Left    Skin Prep: Chloraprep      Patient Prep: patient draped, mask, sterile gloves, hat and hand hygiene      Local skin infiltration:  None    Ultrasound Guided?: Yes      Artery evaluated via ultrasound confirming patency.   Using realtime imaging, the artery was punctured and the needle was observed entering the artery.      A permanent image is entered into patient's chart.      Catheter size:  20 gauge, 12 cm    Cath secured with: suture      Dressing:  Tegaderm    Complications:  None obvious    IBP within 10% of NIBP: Yes

## 2017-06-17 ENCOUNTER — APPOINTMENT (OUTPATIENT)
Dept: GENERAL RADIOLOGY | Facility: CLINIC | Age: 62
DRG: 164 | End: 2017-06-17
Attending: THORACIC SURGERY (CARDIOTHORACIC VASCULAR SURGERY)
Payer: COMMERCIAL

## 2017-06-17 LAB
ANION GAP SERPL CALCULATED.3IONS-SCNC: 6 MMOL/L (ref 3–14)
BUN SERPL-MCNC: 16 MG/DL (ref 7–30)
CALCIUM SERPL-MCNC: 8.3 MG/DL (ref 8.5–10.1)
CHLORIDE SERPL-SCNC: 105 MMOL/L (ref 94–109)
CO2 SERPL-SCNC: 28 MMOL/L (ref 20–32)
CREAT SERPL-MCNC: 0.98 MG/DL (ref 0.66–1.25)
ERYTHROCYTE [DISTWIDTH] IN BLOOD BY AUTOMATED COUNT: 14 % (ref 10–15)
GFR SERPL CREATININE-BSD FRML MDRD: 78 ML/MIN/1.7M2
GLUCOSE SERPL-MCNC: 106 MG/DL (ref 70–99)
HCT VFR BLD AUTO: 48.5 % (ref 40–53)
HGB BLD-MCNC: 16.5 G/DL (ref 13.3–17.7)
MAGNESIUM SERPL-MCNC: 2.4 MG/DL (ref 1.6–2.3)
MCH RBC QN AUTO: 32.1 PG (ref 26.5–33)
MCHC RBC AUTO-ENTMCNC: 34 G/DL (ref 31.5–36.5)
MCV RBC AUTO: 94 FL (ref 78–100)
PHOSPHATE SERPL-MCNC: 3 MG/DL (ref 2.5–4.5)
PLATELET # BLD AUTO: 222 10E9/L (ref 150–450)
POTASSIUM SERPL-SCNC: 4.3 MMOL/L (ref 3.4–5.3)
RBC # BLD AUTO: 5.14 10E12/L (ref 4.4–5.9)
SODIUM SERPL-SCNC: 138 MMOL/L (ref 133–144)
WBC # BLD AUTO: 9.8 10E9/L (ref 4–11)

## 2017-06-17 PROCEDURE — 36415 COLL VENOUS BLD VENIPUNCTURE: CPT | Performed by: THORACIC SURGERY (CARDIOTHORACIC VASCULAR SURGERY)

## 2017-06-17 PROCEDURE — 25000132 ZZH RX MED GY IP 250 OP 250 PS 637: Performed by: THORACIC SURGERY (CARDIOTHORACIC VASCULAR SURGERY)

## 2017-06-17 PROCEDURE — 25000128 H RX IP 250 OP 636: Performed by: THORACIC SURGERY (CARDIOTHORACIC VASCULAR SURGERY)

## 2017-06-17 PROCEDURE — 80048 BASIC METABOLIC PNL TOTAL CA: CPT | Performed by: THORACIC SURGERY (CARDIOTHORACIC VASCULAR SURGERY)

## 2017-06-17 PROCEDURE — 71020 XR CHEST 2 VW: CPT

## 2017-06-17 PROCEDURE — 84100 ASSAY OF PHOSPHORUS: CPT | Performed by: THORACIC SURGERY (CARDIOTHORACIC VASCULAR SURGERY)

## 2017-06-17 PROCEDURE — 71020 XR CHEST 2 VW: CPT | Mod: 77

## 2017-06-17 PROCEDURE — 40000141 ZZH STATISTIC PERIPHERAL IV START W/O US GUIDANCE

## 2017-06-17 PROCEDURE — 85027 COMPLETE CBC AUTOMATED: CPT | Performed by: THORACIC SURGERY (CARDIOTHORACIC VASCULAR SURGERY)

## 2017-06-17 PROCEDURE — 12000008 ZZH R&B INTERMEDIATE UMMC

## 2017-06-17 PROCEDURE — 83735 ASSAY OF MAGNESIUM: CPT | Performed by: THORACIC SURGERY (CARDIOTHORACIC VASCULAR SURGERY)

## 2017-06-17 RX ADMIN — ASPIRIN 81 MG: 81 TABLET, COATED ORAL at 08:30

## 2017-06-17 RX ADMIN — VARENICLINE TARTRATE 1 MG: 1 TABLET, FILM COATED ORAL at 08:30

## 2017-06-17 RX ADMIN — FUROSEMIDE 40 MG: 40 TABLET ORAL at 08:30

## 2017-06-17 RX ADMIN — VARENICLINE TARTRATE 1 MG: 1 TABLET, FILM COATED ORAL at 20:12

## 2017-06-17 RX ADMIN — PANTOPRAZOLE SODIUM 40 MG: 40 TABLET, DELAYED RELEASE ORAL at 08:30

## 2017-06-17 RX ADMIN — ACETAMINOPHEN 975 MG: 325 TABLET, FILM COATED ORAL at 10:04

## 2017-06-17 RX ADMIN — SENNOSIDES AND DOCUSATE SODIUM 2 TABLET: 8.6; 5 TABLET ORAL at 20:12

## 2017-06-17 RX ADMIN — ENOXAPARIN SODIUM 40 MG: 40 INJECTION SUBCUTANEOUS at 03:19

## 2017-06-17 RX ADMIN — METOPROLOL SUCCINATE 50 MG: 50 TABLET, FILM COATED, EXTENDED RELEASE ORAL at 08:30

## 2017-06-17 RX ADMIN — ACETAMINOPHEN 975 MG: 325 TABLET, FILM COATED ORAL at 03:14

## 2017-06-17 RX ADMIN — SENNOSIDES AND DOCUSATE SODIUM 2 TABLET: 8.6; 5 TABLET ORAL at 08:30

## 2017-06-17 ASSESSMENT — PAIN DESCRIPTION - DESCRIPTORS
DESCRIPTORS: CONSTANT;SHARP;SHOOTING
DESCRIPTORS: ACHING

## 2017-06-17 NOTE — PLAN OF CARE
Problem: Goal Outcome Summary  Goal: Goal Outcome Summary  Outcome: No Change  Temp: 99.5  F (37.5  C) Temp src: Oral BP: 127/72 Pulse: 64 Heart Rate: 72 Resp: 22 SpO2: 96 % O2 Device: Nasal cannula Oxygen Delivery: 3 LPM  Pt A&Ox4. T-max 99.5, scheduled Tylenol given. VSS. On 3L NC, O2 sats 93-98%. NSR, 70s. On Capno: Etco2 45-50 and IPI 7-9 this shift. C/o L-CT site pain controlled w/ Dilaudid PCA at 0.2mg q10min. Lung sounds clear/diminished in bases. CT to water seal w/ ~60ml dark red output this shift. No air leak.  Hypoactive bowel sounds, no flatus or BM. Needs encouragement w/ voiding. Up w/ assist x1: up in room x2 this shift. Tolerating clears, eager to start regular diet. Continue to monitor and notify MD of any changes.

## 2017-06-17 NOTE — PROGRESS NOTES
Thoracic surgery progress note    No acute events overnight. Pain controlled. Hungry.     Temp: 99.1  F (37.3  C) Temp  Min: 97.4  F (36.3  C)  Max: 99.5  F (37.5  C)  Resp: 20 Resp  Min: 16  Max: 24  SpO2: 94 % SpO2  Min: 92 %  Max: 100 %  Pulse: 64 Pulse  Min: 64  Max: 64  Heart Rate: 63 Heart Rate  Min: 55  Max: 72  BP: 111/67 Systolic (24hrs), Av , Min:111 , Max:141   Diastolic (24hrs), Av, Min:65, Max:86    A&O, NAD  Unlabored breathing on 3L NC  L chest tube to waterseal w/out leak w/ ssd  Abd soft, non tender, non distended  Ext wwp    I&O  /300  Chest tube 110/45    Labs  Cr 0.98  Hgb 16.5 <-17.7    A/P: 61M s/p L VATS wedge on     - D/c PCA. Transition to PO meds  - Wean O2. IS. Will pull chest tube  - Hold home amlodipine as currently normotensive. C/w home metoprolol  - Advance to reg diet. Bowel regimen  - Dc IVF. Home Lasix  - Lovenox    Seen w/ fellow, Dr Ronald Allred MD  Surgery PGY1  [phone]

## 2017-06-17 NOTE — PROGRESS NOTES
Transfer  Transferred to: 7B  Via:bed  Reason for transfer:Pt no longer appropriate for 6B- improved patient condition  Family: Aware of transfer  Belongings: Packed and sent with pt  Chart: Delivered with pt to next unit  Medications: Meds received from old unit with pt  Pt status: Stable, chest tube removed, PCA DC'd, 3L NC. SBA to bathroom. VSS, NSR.

## 2017-06-17 NOTE — PLAN OF CARE
Vitals:    06/17/17 0805 06/17/17 1100 06/17/17 1228 06/17/17 1300   BP: 111/67  130/60    BP Location: Left arm  Left arm    Pulse:       Resp: 20  18    Temp: 99.1  F (37.3  C)  99.2  F (37.3  C)    TempSrc: Oral  Oral    SpO2: 94% 95% 92% 95%   Weight:       Height:         Tmax 99.2, 92-95% 3L NC, other VSS. A&O, able to make needs known. Denies nausea and SOB. Denies pain at the moment. CT removed prior to transfer, site with moderate sanguinous drainage, new dressing applied. Voided prior to transfer to unit (per pt report). No flatus or BM, hypoactive BS. New PIV in place. Appears comfortable between care. Continue to monitor.

## 2017-06-17 NOTE — PLAN OF CARE
Problem: Goal Outcome Summary  Goal: Goal Outcome Summary  OT 6B: OT evaluation not yet initiated due to multiple schedule conflicts: patient off floor for x-ray, then CT pulled, then moving to different floor. Reschedule for 6/18.

## 2017-06-18 ENCOUNTER — APPOINTMENT (OUTPATIENT)
Dept: PHYSICAL THERAPY | Facility: CLINIC | Age: 62
DRG: 164 | End: 2017-06-18
Attending: THORACIC SURGERY (CARDIOTHORACIC VASCULAR SURGERY)
Payer: COMMERCIAL

## 2017-06-18 VITALS
DIASTOLIC BLOOD PRESSURE: 71 MMHG | TEMPERATURE: 97.9 F | WEIGHT: 281.5 LBS | HEART RATE: 77 BPM | HEIGHT: 72 IN | BODY MASS INDEX: 38.13 KG/M2 | RESPIRATION RATE: 20 BRPM | SYSTOLIC BLOOD PRESSURE: 132 MMHG | OXYGEN SATURATION: 92 %

## 2017-06-18 PROCEDURE — 97530 THERAPEUTIC ACTIVITIES: CPT | Mod: GP

## 2017-06-18 PROCEDURE — 25000132 ZZH RX MED GY IP 250 OP 250 PS 637: Performed by: THORACIC SURGERY (CARDIOTHORACIC VASCULAR SURGERY)

## 2017-06-18 PROCEDURE — 97161 PT EVAL LOW COMPLEX 20 MIN: CPT | Mod: GP

## 2017-06-18 PROCEDURE — 25000132 ZZH RX MED GY IP 250 OP 250 PS 637: Performed by: STUDENT IN AN ORGANIZED HEALTH CARE EDUCATION/TRAINING PROGRAM

## 2017-06-18 PROCEDURE — 25000128 H RX IP 250 OP 636: Performed by: THORACIC SURGERY (CARDIOTHORACIC VASCULAR SURGERY)

## 2017-06-18 PROCEDURE — 40000193 ZZH STATISTIC PT WARD VISIT

## 2017-06-18 PROCEDURE — 97116 GAIT TRAINING THERAPY: CPT | Mod: GP

## 2017-06-18 PROCEDURE — 25000128 H RX IP 250 OP 636: Performed by: STUDENT IN AN ORGANIZED HEALTH CARE EDUCATION/TRAINING PROGRAM

## 2017-06-18 RX ORDER — AMOXICILLIN 250 MG
1-2 CAPSULE ORAL 2 TIMES DAILY
Qty: 100 TABLET | Refills: 0 | Status: SHIPPED | OUTPATIENT
Start: 2017-06-18

## 2017-06-18 RX ORDER — ACETAMINOPHEN 325 MG/1
975 TABLET ORAL EVERY 8 HOURS
Qty: 100 TABLET | Refills: 0 | Status: SHIPPED | OUTPATIENT
Start: 2017-06-18

## 2017-06-18 RX ORDER — AMLODIPINE BESYLATE 10 MG/1
10 TABLET ORAL EVERY MORNING
Status: DISCONTINUED | OUTPATIENT
Start: 2017-06-18 | End: 2017-06-18 | Stop reason: HOSPADM

## 2017-06-18 RX ORDER — FUROSEMIDE 10 MG/ML
40 INJECTION INTRAMUSCULAR; INTRAVENOUS ONCE
Status: COMPLETED | OUTPATIENT
Start: 2017-06-18 | End: 2017-06-18

## 2017-06-18 RX ORDER — OXYCODONE HYDROCHLORIDE 5 MG/1
5-10 TABLET ORAL EVERY 4 HOURS PRN
Qty: 30 TABLET | Refills: 0 | Status: SHIPPED | OUTPATIENT
Start: 2017-06-18

## 2017-06-18 RX ADMIN — FUROSEMIDE 40 MG: 40 TABLET ORAL at 09:05

## 2017-06-18 RX ADMIN — PANTOPRAZOLE SODIUM 40 MG: 40 TABLET, DELAYED RELEASE ORAL at 09:05

## 2017-06-18 RX ADMIN — SENNOSIDES AND DOCUSATE SODIUM 1 TABLET: 8.6; 5 TABLET ORAL at 09:05

## 2017-06-18 RX ADMIN — ACETAMINOPHEN 975 MG: 325 TABLET, FILM COATED ORAL at 10:29

## 2017-06-18 RX ADMIN — FUROSEMIDE 40 MG: 10 INJECTION, SOLUTION INTRAVENOUS at 09:05

## 2017-06-18 RX ADMIN — METOPROLOL SUCCINATE 50 MG: 50 TABLET, FILM COATED, EXTENDED RELEASE ORAL at 09:06

## 2017-06-18 RX ADMIN — VARENICLINE TARTRATE 1 MG: 1 TABLET, FILM COATED ORAL at 09:06

## 2017-06-18 RX ADMIN — ASPIRIN 81 MG: 81 TABLET, COATED ORAL at 09:05

## 2017-06-18 RX ADMIN — ENOXAPARIN SODIUM 40 MG: 40 INJECTION SUBCUTANEOUS at 03:36

## 2017-06-18 RX ADMIN — AMLODIPINE BESYLATE 10 MG: 10 TABLET ORAL at 09:06

## 2017-06-18 NOTE — PLAN OF CARE
"Problem: Individualization  Goal: Patient Preferences  Outcome: No Change  /72 (BP Location: Left arm)  Pulse 77  Temp 100.3  F (37.9  C) (Oral)  Resp 20  Ht 1.83 m (6' 0.05\")  Wt 127.7 kg (281 lb 8 oz)  SpO2 92%  BMI 38.13 kg/m2     Slight hypertensive, max temp 100.7, pt refuses scheduled tylenol.  Girish pain or SOB.  Ambulated in room and bathroom, voiding spont, not saving.  Denies n/v, passing flatus, no BM.  Chest tube removed today and covering with the dressing. Small serosang drainage noted on dressing (marked).  Continue POC.       "

## 2017-06-18 NOTE — PLAN OF CARE
Problem: Goal Outcome Summary  Goal: Goal Outcome Summary  Pt discharged home after PT eval, see eval for functional status.     Physical Therapy Discharge Summary     Reason for therapy discharge:    Discharged to home.     Progress towards therapy goal(s). See goals on Care Plan in [facility] electronic health record for goal details.  Goals not met.  Barriers to achieving goals:   discharge on same date as initial evaluation.     Therapy recommendation(s):    Continue home exercise program.

## 2017-06-18 NOTE — PLAN OF CARE
Problem: Goal Outcome Summary  Goal: Goal Outcome Summary  OT 7B: OT orders acknowledged and appreciated. Per PT, no acute OT needs at this time. Will defer, if changes in functional status or questions please contact.

## 2017-06-18 NOTE — PLAN OF CARE
Problem: Goal Outcome Summary  Goal: Goal Outcome Summary  PT: Eval completed, treat initiated. Pt on RA at rest sats 89%. Pt amb with no O2 on Ra, sats 85-87%. On 1 L o2 sats 88-89%. On 2 L o2 sats 90%-93%. Education on PLB, inc activity tolerance. RN aware of sats during PT session. Recommend discharge to home when medically stable and weaned from O2 as per MD note.

## 2017-06-18 NOTE — PLAN OF CARE
Vitals:    06/18/17 1130 06/18/17 1145 06/18/17 1200 06/18/17 1215   BP:       BP Location:       Pulse:       Resp:       Temp:       TempSrc:       SpO2: 92% 91% 93% 92%   Weight:       Height:         Afebrile, 91-93% RA, other VSS. A&O, able to make needs known. Denies Pain, nausea and SOB. Scheduled Tylenol administered. Blood tinged sputum. L incision dressing changed per MD, dressing CDI. Voiding spontaneously, not saving. BM overnight. Tolerating regular diet. Ambulated in [place] with SBA. Pt educated on IS and using independently. Pt discharged home with d/c instructions, no further questions.

## 2017-06-18 NOTE — PROGRESS NOTES
06/18/17 0900   Quick Adds   Type of Visit Initial PT Evaluation   Living Environment   Lives With significant other   Living Arrangements house   Home Accessibility stairs to enter home   Number of Stairs to Enter Home 3   Number of Stairs Within Home 0   Stair Railings at Home outside, present at both sides   Transportation Available family or friend will provide;car   Self-Care   Dominant Hand right   Regular Exercise yes   Activity/Exercise Type walking;biking   Exercise Amount/Frequency 2 times/wk   Activity/Exercise/Self-Care Comment Pt owns cane, minimally uses   Functional Level Prior   Ambulation 0-->independent   Transferring 0-->independent   Toileting 0-->independent   Bathing 0-->independent   Dressing 0-->independent   Eating 0-->independent   Communication 0-->understands/communicates without difficulty   Cognition 0 - no cognition issues reported   Fall history within last six months no   Which of the above functional risks had a recent onset or change? none   Prior Functional Level Comment Pt was I with ADLs and mobility prior to admission   General Information   Onset of Illness/Injury or Date of Surgery - Date 06/16/17   Referring Physician Zack Rangel MD   Patient/Family Goals Statement get back home   Pertinent History of Current Problem (include personal factors and/or comorbidities that impact the POC) 61M s/p L VATS wedge on 6/16   Precautions/Limitations oxygen therapy device and L/min   Weight-Bearing Status - LLE full weight-bearing   Weight-Bearing Status - RLE full weight-bearing   Heart Disease Risk Factors Medical history   General Info Comments PT: Pt agreeable to PT   Cognitive Status Examination   Orientation orientation to person, place and time   Level of Consciousness alert   Follows Commands and Answers Questions 100% of the time   Personal Safety and Judgment intact   Memory intact   Posture    Posture Protracted shoulders   Range of Motion (ROM)   ROM Comment B LEs WFL  "  Strength   Strength Comments B LEs mildy decreased due to procedure, based on functional mobility   Bed Mobility   Bed Mobility Comments SBA with HOB up   Transfer Skills   Transfer Comments CGa sit<>stand   Gait   Gait Comments CGA to amb 150' no AD   Balance   Balance Comments fair with no Ad, lateral deviations   General Therapy Interventions   Planned Therapy Interventions balance training;gait training;strengthening;risk factor education;home program guidelines;progressive activity/exercise   Clinical Impression   Criteria for Skilled Therapeutic Intervention yes, treatment indicated   PT Diagnosis impaired functional mobility   Influenced by the following impairments decreased strength, act tolerance, balance   Functional limitations due to impairments decreased I with transfers, gait bed mob,  barrier navigation, ADL's   Clinical Presentation Stable/Uncomplicated   Clinical Presentation Rationale PT: Pt presents with 1 personal factors and/or comorbidities that impact the plan of care including pain. Upon examination of body systems using standardized tests and measures addressing 2 elements from: body structures and functions, activity limitations and/or participation restrictions. Based on evaluation, pt presents requiring low complexity level of decision making to determine POC.   Clinical Decision Making (Complexity) Low complexity   Therapy Frequency` 5 times/week   Predicted Duration of Therapy Intervention (days/wks) 6/23   Anticipated Discharge Disposition Home   Risk & Benefits of therapy have been explained Yes   Patient, Family & other staff in agreement with plan of care Yes   [facility] [employer] TM \"6 Clicks\"   2016, Trustees of [facility], under license to [employer].  All rights reserved.   6 Clicks Short Forms Basic Mobility Inpatient Short Form   [facility] AM-PAC  \"6 Clicks\" V.2 Basic Mobility Inpatient Short Form   1. Turning from your back to your side while in a " flat bed without using bedrails? 3 - A Little   2. Moving from lying on your back to sitting on the side of a flat bed without using bedrails? 3 - A Little   3. Moving to and from a bed to a chair (including a wheelchair)? 3 - A Little   4. Standing up from a chair using your arms (e.g., wheelchair, or bedside chair)? 3 - A Little   5. To walk in hospital room? 3 - A Little   6. Climbing 3-5 steps with a railing? 3 - A Little   Basic Mobility Raw Score (Score out of 24.Lower scores equate to lower levels of function) 18   Total Evaluation Time   Total Evaluation Time (Minutes) 8

## 2017-06-18 NOTE — PLAN OF CARE
Problem: Goal Outcome Summary  Goal: Goal Outcome Summary  Outcome: Improving  Vital signs:  Temp: 98.3  F (36.8  C) Temp src: Oral BP: 141/64 Pulse: 77 Heart Rate: 70 Resp: 20 SpO2: 91 % O2 Device: Nasal cannula Oxygen Delivery: 2 LPM   Tmax 100.1, recheck 98.3. Other VSS. Pt reports pain but declines offer for pain medication, refused scheduled tylenol. Denies nausea. Chest tube removal site dressing with dried drainage, marked with no change. Up with SBA. Voiding, not saving. Passing flatus, BM x 1. Sleeping throughout the night.

## 2017-06-18 NOTE — PROGRESS NOTES
Thoracic surgery progress note    Low grade temps overnight. Pain controlled. Blood tinged sputum. Not using his IS. Tolerating PO. Having BMs.    Temp: 98.3  F (36.8  C) Temp  Min: 98.3  F (36.8  C)  Max: 100.7  F (38.2  C)  Resp: 20 Resp  Min: 18  Max: 28  SpO2: 91 % SpO2  Min: 91 %  Max: 95 %  Pulse: 77 Pulse  Min: 77  Max: 77  Heart Rate: 70 Heart Rate  Min: 63  Max: 75  BP: 141/64 Systolic (24hrs), Av , Min:111 , Max:150   Diastolic (24hrs), Av, Min:60, Max:73    A&O, NAD  Unlabored breathing on 2L  Chest inc w/ some ss staining on dressing  RRR  Abd soft, non tender, non distended    A/P: 61M s/p L VATS wedge on     - Pain control  - Wean O2. IS. Will give 40mg IV Lasix once  - Home antihypertensives  - Reg diet. Bowel regimen  - Lovenox    Dispo: Home today if off O2    Seen w/ fellow, Dr Claire Allred MD  Surgery PGY1  [phone]

## 2017-06-19 ENCOUNTER — CARE COORDINATION (OUTPATIENT)
Dept: CARE COORDINATION | Facility: CLINIC | Age: 62
End: 2017-06-19

## 2017-06-19 ENCOUNTER — TELEPHONE (OUTPATIENT)
Dept: SURGERY | Facility: CLINIC | Age: 62
End: 2017-06-19

## 2017-06-19 NOTE — TELEPHONE ENCOUNTER
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 I faxed over records to Dee and she will contact patient to arrange an appointment.

## 2017-06-19 NOTE — PROGRESS NOTES
"[facility]  \"Hello, my name is Martha Connor , and I am calling from the [facility].  I want to check in and see how you are doing, after leaving the hospital.  You may also receive a call from your Care Coordinator (care team), but I want to make sure you don t have any urgent needs.  I have a couple questions to review with you:     Post-Discharge Outreach                                                    Domenico Mayorga is a 61 year old male     Follow-up Appointments           Adult [facility] Follow-up and recommended labs and tests         Follow up with DARIUSZ Williamson in 4 weeks  to evaluate after surgery. The following labs/tests are recommended: Chest XR.                Care Team:    Patient Care Team       Relationship Specialty Notifications Start End    Kurtis De Leon PCP - General Family Practice  5/12/17     Phone: [phone] Fax: [phone]         [address] [address]    uGillaume Mix MD Referring Physician Family Medicine - Sports Medicine  5/12/17     Phone: [phone] Fax: [phone]         [facility] [address] [facility] [facility] [address]    Asha Bullard RN Registered Nurse Orthopaedic Surgery  5/24/17     Phone: [phone] Pager: [phone]                Transition of Care Review                                                      Did you have a surgery or procedure during your hospital visit? Yes   If yes, do you have any of the following:     Signs of infection:  NO    Pain:  Yes     Pain Scale (0-10) 6/10     Location: Surg site when breathing in deep    Wound/incision concerns? No    Do you have all of your medications/refills?  Yes    Are you having any side effects or questions about your medication(s)? No    Do you have any new or worsening symptoms?  No    Do you have any future appointments scheduled?   NO             Plan                                                "       Thanks for your time.  Your Care Coordinator may follow-up within the next couple days.  In the meantime if you have questions, concerns or problems call your care team.        Martha Connor

## 2017-06-19 NOTE — DISCHARGE SUMMARY
Thoracic Surgery Discharge Summary    Domenico Mayorga MRN# [id number]   YOB: 1955 Age: 61 year old     Date of Admission:  6/16/2017  Date of Discharge::  6/18/2017  2:14 PM  Admitting Physician:  Silas Gonzalez MD  Discharge Physician:  Silas Gonzalez MD  Primary Care Physician:       Kurtis De Leon          Admission Diagnoses:   Left Lung Nodule   Sarcoma (H)          Discharge Diagnosis:   S/p VATS wedge resection         Procedures:   Left unipolar upper lobe wedge resection        Non-operative procedures:   None performed          Consultations:   PHYSICAL THERAPY ADULT IP CONSULT  VASCULAR ACCESS CARE ADULT IP CONSULT         Medications Prior to Admission:     Prior to Admission Medications   Prescriptions Last Dose Informant Patient Reported? Taking?   Multiple Vitamins-Minerals (OCUVITE EXTRA) TABS 6/15/2017 at 0630  Yes Yes   Sig: Take 1 tablet by mouth daily    UNABLE TO FIND 6/15/2017 at 0630  Yes Yes   Sig: Fit and supply ([facility]) stocking.   amLODIPine (NORVASC) 10 MG tablet 6/16/2017 at 0430  Yes Yes   Sig: Take 10 mg by mouth every morning    aspirin 81 MG EC tablet 6/15/2017 at 0630  Yes Yes   Sig: Take 81 mg by mouth every morning    furosemide (LASIX) 40 MG tablet 6/15/2017 at 0630  Yes Yes   Sig: Take 40 mg by mouth every morning    metoprolol (TOPROL-XL) 50 MG 24 hr tablet 6/16/2017 at 0430  Yes Yes   Sig: TAKE 1 TABLET BY MOUTH ONCE DAILY. DO NOT CHEW OR CRUSH, daily-AM   varenicline (CHANTIX) 1 MG tablet 6/15/2017 at 1800  Yes Yes   Sig: Take 1 mg by mouth      Facility-Administered Medications: None            Discharge Medications:      Domenico Mayorga   Home Medication Instructions TRAVIS:[id number]    Printed on:06/19/17 0944   Medication Information                      acetaminophen (TYLENOL) 325 MG tablet  Take 3 tablets (975 mg) by mouth every 8 hours             amLODIPine (NORVASC) 10 MG tablet  Take 10 mg by mouth every morning              aspirin  "81 MG EC tablet  Take 81 mg by mouth every morning              furosemide (LASIX) 40 MG tablet  Take 40 mg by mouth every morning              metoprolol (TOPROL-XL) 50 MG 24 hr tablet  TAKE 1 TABLET BY MOUTH ONCE DAILY. DO NOT CHEW OR CRUSH, daily-AM             Multiple Vitamins-Minerals (OCUVITE EXTRA) TABS  Take 1 tablet by mouth daily              oxyCODONE (ROXICODONE) 5 MG IR tablet  Take 1-2 tablets (5-10 mg) by mouth every 4 hours as needed for moderate to severe pain             senna-docusate (SENOKOT-S;PERICOLACE) 8.6-50 MG per tablet  Take 1-2 tablets by mouth 2 times daily             UNABLE TO FIND  Fit and supply ([facility]) stocking.             varenicline (CHANTIX) 1 MG tablet  Take 1 mg by mouth                       Day of Discharge Exam   /71 (BP Location: Left arm)  Pulse 77  Temp 97.9  F (36.6  C) (Oral)  Resp 20  Ht 1.83 m (6' 0.05\")  Wt 127.7 kg (281 lb 8 oz)  SpO2 92%  BMI 38.13 kg/m2    General:  A&Ox3, NAD  Cardio:   RRR  Chest:   Non labored breathing on RA. Inc c/d/i  Abd:   Soft, non-distended, non tender  Ext:   WWP          Brief History of Illness:   From clinic note: \"Mr. 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 He noticed the bicep mass in April, and a biopsy in early May was positive for sarcoma. On workup he was found to have lung nodules. His oncologist is deciding the combination and order of surgery, chemotherapy, and radiation\"           Hospital Course:   The patient was admitted and underwent the above procedure. The patient tolerated the procedure well. There were no complications. Postoperatively the patient was transferred to the general floor for further care. The patient's diet was slowly advanced as bowel function returned. Chest tube was discontinued on POD 1. Pain was controlled with oral pain medication and the patient was able to ambulate and void without difficulty. The patient received appropriate " education post operatively. On POD 2 the patient was discharged to home with appropriate instructions and follow up. The patient acknowledged understanding and were in agreement with the plan.         Antibiotics Prescribed at Discharge:   None prescribed         Imaging Studies:   No studies require specific follow-up  Results for orders placed or performed during the hospital encounter of 06/16/17   XR Chest Port 1 View    Narrative    EXAM: XR CHEST PORT 1 VW  6/16/2017 11:01 AM     HISTORY:  Post Op thoracic surgery       COMPARISON: CT chest 5/31/2017    FINDINGS: Upright AP view of chest. Postsurgical changes of left upper  lobe wedge resection. Left apical chest tube. Left lower thorax  subcutaneous emphysema. Tiny left pneumothorax. Low lung volumes.  Multifocal patchy left greater than right pulmonary opacities.      Impression    IMPRESSION:   1. Postsurgical changes of left upper lobe wedge resection with tiny  pneumothorax.  2. Multifocal patchy left greater than right pulmonary opacities,  likely infection, hemorrhage or ARDS.    I have personally reviewed the examination and initial interpretation  and I agree with the findings.    KARY SOLIZ MD   XR Chest 2 Views    Narrative    EXAMINATION: XR CHEST 2 VW, 6/17/2017 11:09 AM    COMPARISON: 6/16/2017    HISTORY: Interval change    FINDINGS: Left chest tube in place. Tiny left pneumothorax better  appreciated currently. Increased left chest subcutaneous emphysema.  Mixed streaky basilar opacities are unchanged.       Impression    IMPRESSION:   1. Tiny left apical pneumothorax with chest tube in place. Increased  left chest wall subcutaneous emphysema.  2. Continued basilar streaky opacities favoring atelectasis.    MAULIK WREN MD   XR Chest 2 Views    Narrative    PA and lateral chest x-ray    Comparisons: 6/17/2017 at 10:57 AM    HISTORY: Follow-up pneumothorax. Follow-up subcutaneous emphysema.    FINDINGS: Left-sided chest tube has been  removed. Small left midlung  pneumothorax, unchanged. Patchy bibasilar opacities are unchanged. No  significant change in subcutaneous emphysema. Mild blunting of both  costophrenic angles. Cardiac silhouette is unchanged in size.  Pulmonary vascularity is distinct.      Impression    IMPRESSION:  1. Unchanged small left midlung pneumothorax post chest tube removal.  2. No significant change in atelectasis/postoperative findings in both  lung bases.  3. Small bilateral pleural effusions.    ROSALBA MCKAY MD            Final Pathology Result:   Pending at time of discharge         Discharge Instructions:     X-ray Chest 2 vws*     Reason for your hospital stay   S/p L VATS wedge     Adult [facility] Follow-up and recommended labs and tests   Follow up with DARIUSZ Williamson in 4 weeks  to evaluate after surgery. The following labs/tests are recommended: Chest XR.    Appointments on [place] and/or [facility] (with [facility] or [facility] provider or service). Call [phone] if you haven't heard regarding these appointments within 7 days of discharge.     Discharge Instructions   THORACIC SURGERY DISCHARGE INSTRUCTIONS    DIET: Regular diet as tolerated    If your plans upon discharge include prolonged periods of sitting (i.e a lengthy car or plane ride), it is highly beneficial to get up and walk at least once per hour to help prevent swelling and blood clots.     You may remove chest tube dressing 48 hours after tube removal and bandage the site at your own discretion thereafter.  Small amounts of leakage are normal for 2-3 days after removal.  Feel free to call with questions.    You may get incision wet 2 days after operation. Do not submerge, soak, or scrub incision or swim until seen in follow-up.    Take incentive spirometer home for continued frequent use    Activity as tolerated, no strenous activity until seen in follow-up, no lifting greater than 20 pounds for the next 4 weeks.    Stay hydrated.  "Take over the counter fiber (metamucil or benefiber) and stool softeners (Miralax, docusate or senna) if becoming constipated.     Call for fever greater than 101.5, chills, increased size of incision, red skin around incision, vision changes, muscle strength changes, sensation changes, shortness of breath, or other concerns.    No driving while taking narcotic pain medication.    Transition to ibuprofen or tylenol/acetaminophen for pain control. Do not take tylenol/acetaminophen and acetaminophen containing narcotic (e.g., percocet or vicodin) at the same time. If you have known ulcer problems, or kidney trouble (elevated creatinine) do not take the ibuprofen.    In emergencies, call 911    For other Questions or Concerns;  A.) During weekday working hours (Monday through Friday 8am to 4:30pm)  call [phone] and ask to speak to a clinical nurse specialist.    B.) At nights (after 4:30pm), on weekends, or if urgent call [phone] and  tell the  \"I would like to page job code [phone], the thoracic surgery  fellow on call, please.\"     Diet   Follow this diet upon discharge: Regular Diet Adult           Home Health Care:   Not needed           Discharge Disposition:   Discharged to home      Condition at discharge: Stable      Carroll Allred MD  Surgery, PGY-1  "

## 2017-06-20 LAB — COPATH REPORT: NORMAL

## 2017-06-23 ENCOUNTER — TRANSFERRED RECORDS (OUTPATIENT)
Dept: HEALTH INFORMATION MANAGEMENT | Facility: CLINIC | Age: 62
End: 2017-06-23

## 2017-09-26 LAB
DLCOUNC-%PRED-PRE: 109 %
DLCOUNC-PRE: 31.62 ML/MIN/MMHG
DLCOUNC-PRED: 28.81 ML/MIN/MMHG
ERV-%PRED-PRE: 6 %
ERV-PRE: 0.05 L
ERV-PRED: 0.8 L
EXPTIME-PRE: 8.09 SEC
FEF2575-%PRED-POST: 80 %
FEF2575-%PRED-PRE: 60 %
FEF2575-POST: 2.31 L/SEC
FEF2575-PRE: 1.74 L/SEC
FEF2575-PRED: 2.86 L/SEC
FEFMAX-%PRED-PRE: 75 %
FEFMAX-PRE: 7.19 L/SEC
FEFMAX-PRED: 9.53 L/SEC
FEV1-%PRED-PRE: 68 %
FEV1-PRE: 2.38 L
FEV1FEV6-PRE: 75 %
FEV1FEV6-PRED: 79 %
FEV1FVC-PRE: 74 %
FEV1FVC-PRED: 78 %
FEV1SVC-PRE: 72 %
FEV1SVC-PRED: 66 %
FIFMAX-PRE: 5.43 L/SEC
FRCPLETH-%PRED-PRE: 67 %
FRCPLETH-PRE: 2.52 L
FRCPLETH-PRED: 3.75 L
FVC-%PRED-PRE: 71 %
FVC-PRE: 3.2 L
FVC-PRED: 4.5 L
IC-%PRED-PRE: 72 %
IC-PRE: 3.26 L
IC-PRED: 4.5 L
RVPLETH-%PRED-PRE: 97 %
RVPLETH-PRE: 2.47 L
RVPLETH-PRED: 2.53 L
TLCPLETH-%PRED-PRE: 76 %
TLCPLETH-PRE: 5.78 L
TLCPLETH-PRED: 7.53 L
VA-%PRED-PRE: 81 %
VA-PRE: 5.86 L
VC-%PRED-PRE: 62 %
VC-PRE: 3.31 L
VC-PRED: 5.3 L

## 2020-03-10 ENCOUNTER — HEALTH MAINTENANCE LETTER (OUTPATIENT)
Age: 65
End: 2020-03-10

## 2020-12-27 ENCOUNTER — HEALTH MAINTENANCE LETTER (OUTPATIENT)
Age: 65
End: 2020-12-27

## 2021-10-09 ENCOUNTER — HEALTH MAINTENANCE LETTER (OUTPATIENT)
Age: 66
End: 2021-10-09

## 2022-01-29 ENCOUNTER — HEALTH MAINTENANCE LETTER (OUTPATIENT)
Age: 67
End: 2022-01-29

## 2022-09-17 ENCOUNTER — HEALTH MAINTENANCE LETTER (OUTPATIENT)
Age: 67
End: 2022-09-17

## 2023-05-06 ENCOUNTER — HEALTH MAINTENANCE LETTER (OUTPATIENT)
Age: 68
End: 2023-05-06

## (undated) DEVICE — DRSG PRIMAPORE 02X3" 7133

## (undated) DEVICE — TIES BANDING T50R

## (undated) DEVICE — DRSG KERLIX 4 1/2"X4YDS ROLL 6715

## (undated) DEVICE — TAPE DURAPORE 3" SILK 1538-3

## (undated) DEVICE — SU SILK 0 SH 30" K834H

## (undated) DEVICE — DECANTER BAG 2002S

## (undated) DEVICE — ENDO VALVE BX EVIS MAJ-210

## (undated) DEVICE — STPL ENDO RELOAD 60MM MEDIUM THICK PURPLE EGIA60AMT

## (undated) DEVICE — COVER CAMERA IN-LIGHT DISP LT-C02

## (undated) DEVICE — ESU PENCIL W/COATED BLADE E2450H

## (undated) DEVICE — Device

## (undated) DEVICE — BLADE CLIPPER SGL USE 9680

## (undated) DEVICE — ENDO VALVE SUCTION ULTRASOUND BRONCH MAJ-1414

## (undated) DEVICE — ENDO VALVE SUCTION BRONCH EVIS MAJ-209

## (undated) DEVICE — ESU GROUND PAD ADULT W/CORD E7507

## (undated) DEVICE — SU DERMABOND ADVANCED .7ML DNX12

## (undated) DEVICE — DRSG DRAIN 2X2" 7087

## (undated) DEVICE — SUCTION DRY CHEST DRAIN OASIS 3600-100

## (undated) DEVICE — DRAPE IOBAN INCISE 23X17" 6650EZ

## (undated) DEVICE — SU SILK 3-0 TIE 12X30" A304H

## (undated) DEVICE — DRAIN CHEST TUBE 24FR STR 8024

## (undated) DEVICE — DRAPE ISOLATION BAG 1003

## (undated) DEVICE — GLOVE PROTEXIS POWDER FREE 8.0 ORTHOPEDIC 2D73ET80

## (undated) DEVICE — TUBING SUCTION 10'X3/16" N510

## (undated) DEVICE — ESU ELEC BLADE 6" COATED/INSULATED E1455-6

## (undated) DEVICE — SYR 30ML LL W/O NDL 302832

## (undated) DEVICE — SPONGE RAY-TEC 4X8" 7318

## (undated) DEVICE — TUBING INSUFFLATION W/FILTER CPC TO LUER 620-030-301

## (undated) DEVICE — APPLICATOR SPRAY TIP PROGEL PGEN005-1

## (undated) DEVICE — STPL ENDO GIA 60X3.5MM STRAIGHT 030414

## (undated) DEVICE — SU VICRYL 0 UR-6 27" J603H

## (undated) DEVICE — LINEN GOWN XLG 5407

## (undated) DEVICE — LINEN TOWEL PACK X6 WHITE 5487

## (undated) DEVICE — LINEN TOWEL PACK X5 5464

## (undated) DEVICE — SOL ADH LIQUID BENZOIN SWAB 0.6ML C1544

## (undated) DEVICE — SU VICRYL 2-0 SH 27" UND J417H

## (undated) DEVICE — SPONGE TONSIL W/STRING MED 23275-680

## (undated) DEVICE — SU VICRYL 4-0 PS-2 18" UND J496H

## (undated) DEVICE — STPL ENDO RELOAD 45MM MEDIUM THICK PURPLE EGIA45AMT

## (undated) DEVICE — STPL ENDO RELOAD GIA 45X3.5MM ROTIC 030455

## (undated) DEVICE — STPL ENDO HANDLE GIA ULTRA UNIVERSAL STD EGIAUSTND

## (undated) DEVICE — SUCTION TIP YANKAUER STR K87

## (undated) DEVICE — SU SILK 0 TIE 6X30" A306H

## (undated) DEVICE — PREP CHLORAPREP 26ML TINTED ORANGE  260815

## (undated) DEVICE — NDL BLUNT 18GA 1" W/O FILTER 305181

## (undated) RX ORDER — ACETAMINOPHEN 325 MG/1
TABLET ORAL
Status: DISPENSED
Start: 2017-06-16

## (undated) RX ORDER — ESMOLOL HYDROCHLORIDE 10 MG/ML
INJECTION INTRAVENOUS
Status: DISPENSED
Start: 2017-06-16

## (undated) RX ORDER — ONDANSETRON 2 MG/ML
INJECTION INTRAMUSCULAR; INTRAVENOUS
Status: DISPENSED
Start: 2017-06-16

## (undated) RX ORDER — FENTANYL CITRATE 50 UG/ML
INJECTION, SOLUTION INTRAMUSCULAR; INTRAVENOUS
Status: DISPENSED
Start: 2017-06-16

## (undated) RX ORDER — SODIUM CHLORIDE, SODIUM LACTATE, POTASSIUM CHLORIDE, CALCIUM CHLORIDE 600; 310; 30; 20 MG/100ML; MG/100ML; MG/100ML; MG/100ML
INJECTION, SOLUTION INTRAVENOUS
Status: DISPENSED
Start: 2017-06-16

## (undated) RX ORDER — EPHEDRINE SULFATE 50 MG/ML
INJECTION, SOLUTION INTRAMUSCULAR; INTRAVENOUS; SUBCUTANEOUS
Status: DISPENSED
Start: 2017-06-16

## (undated) RX ORDER — ALBUTEROL SULFATE 0.83 MG/ML
SOLUTION RESPIRATORY (INHALATION)
Status: DISPENSED
Start: 2017-06-15